# Patient Record
Sex: FEMALE | Race: WHITE | NOT HISPANIC OR LATINO | Employment: OTHER | URBAN - NONMETROPOLITAN AREA
[De-identification: names, ages, dates, MRNs, and addresses within clinical notes are randomized per-mention and may not be internally consistent; named-entity substitution may affect disease eponyms.]

---

## 2017-06-01 ENCOUNTER — TRANSFERRED RECORDS (OUTPATIENT)
Dept: HEALTH INFORMATION MANAGEMENT | Facility: OTHER | Age: 62
End: 2017-06-01

## 2018-06-21 ENCOUNTER — TRANSFERRED RECORDS (OUTPATIENT)
Dept: HEALTH INFORMATION MANAGEMENT | Facility: OTHER | Age: 63
End: 2018-06-21

## 2019-06-25 ENCOUNTER — TRANSFERRED RECORDS (OUTPATIENT)
Dept: HEALTH INFORMATION MANAGEMENT | Facility: OTHER | Age: 64
End: 2019-06-25

## 2020-07-24 ENCOUNTER — TRANSFERRED RECORDS (OUTPATIENT)
Dept: HEALTH INFORMATION MANAGEMENT | Facility: OTHER | Age: 65
End: 2020-07-24

## 2021-08-03 ENCOUNTER — TRANSFERRED RECORDS (OUTPATIENT)
Dept: HEALTH INFORMATION MANAGEMENT | Facility: OTHER | Age: 66
End: 2021-08-03

## 2021-08-03 ENCOUNTER — TRANSFERRED RECORDS (OUTPATIENT)
Dept: HEALTH INFORMATION MANAGEMENT | Facility: CLINIC | Age: 66
End: 2021-08-03
Payer: COMMERCIAL

## 2021-10-21 ENCOUNTER — ALLIED HEALTH/NURSE VISIT (OUTPATIENT)
Dept: FAMILY MEDICINE | Facility: OTHER | Age: 66
End: 2021-10-21
Attending: FAMILY MEDICINE
Payer: COMMERCIAL

## 2021-10-21 DIAGNOSIS — Z20.822 EXPOSURE TO 2019 NOVEL CORONAVIRUS: ICD-10-CM

## 2021-10-21 DIAGNOSIS — Z20.822 COVID-19 RULED OUT: Primary | ICD-10-CM

## 2021-10-21 DIAGNOSIS — R05.9 COUGH: ICD-10-CM

## 2021-10-21 PROCEDURE — C9803 HOPD COVID-19 SPEC COLLECT: HCPCS

## 2021-10-21 PROCEDURE — U0003 INFECTIOUS AGENT DETECTION BY NUCLEIC ACID (DNA OR RNA); SEVERE ACUTE RESPIRATORY SYNDROME CORONAVIRUS 2 (SARS-COV-2) (CORONAVIRUS DISEASE [COVID-19]), AMPLIFIED PROBE TECHNIQUE, MAKING USE OF HIGH THROUGHPUT TECHNOLOGIES AS DESCRIBED BY CMS-2020-01-R: HCPCS | Mod: ZL

## 2021-10-22 LAB — SARS-COV-2 RNA RESP QL NAA+PROBE: POSITIVE

## 2021-10-30 ENCOUNTER — HEALTH MAINTENANCE LETTER (OUTPATIENT)
Age: 66
End: 2021-10-30

## 2022-03-30 ENCOUNTER — OFFICE VISIT (OUTPATIENT)
Dept: FAMILY MEDICINE | Facility: OTHER | Age: 67
End: 2022-03-30
Attending: FAMILY MEDICINE
Payer: COMMERCIAL

## 2022-03-30 ENCOUNTER — HOSPITAL ENCOUNTER (OUTPATIENT)
Dept: GENERAL RADIOLOGY | Facility: OTHER | Age: 67
Discharge: HOME | End: 2022-03-30
Attending: FAMILY MEDICINE
Payer: COMMERCIAL

## 2022-03-30 VITALS
HEART RATE: 61 BPM | OXYGEN SATURATION: 97 % | WEIGHT: 232.8 LBS | RESPIRATION RATE: 16 BRPM | TEMPERATURE: 97.7 F | DIASTOLIC BLOOD PRESSURE: 82 MMHG | SYSTOLIC BLOOD PRESSURE: 148 MMHG

## 2022-03-30 DIAGNOSIS — R10.9 RIGHT FLANK PAIN: Primary | ICD-10-CM

## 2022-03-30 DIAGNOSIS — T75.3XXD MOTION SICKNESS, SUBSEQUENT ENCOUNTER: ICD-10-CM

## 2022-03-30 DIAGNOSIS — I10 BENIGN ESSENTIAL HYPERTENSION: ICD-10-CM

## 2022-03-30 DIAGNOSIS — R10.9 RIGHT FLANK PAIN: ICD-10-CM

## 2022-03-30 DIAGNOSIS — Z00.00 HEALTHCARE MAINTENANCE: ICD-10-CM

## 2022-03-30 LAB
ALBUMIN SERPL-MCNC: 4.3 G/DL (ref 3.5–5.7)
ALBUMIN UR-MCNC: NEGATIVE MG/DL
ALP SERPL-CCNC: 51 U/L (ref 34–104)
ALT SERPL W P-5'-P-CCNC: 18 U/L (ref 7–52)
APPEARANCE UR: CLEAR
AST SERPL W P-5'-P-CCNC: 18 U/L (ref 13–39)
BACTERIA #/AREA URNS HPF: ABNORMAL /HPF
BILIRUB DIRECT SERPL-MCNC: 0.1 MG/DL (ref 0–0.2)
BILIRUB SERPL-MCNC: 0.5 MG/DL (ref 0.3–1)
BILIRUB UR QL STRIP: NEGATIVE
COLOR UR AUTO: ABNORMAL
GLUCOSE UR STRIP-MCNC: NEGATIVE MG/DL
HGB UR QL STRIP: NEGATIVE
HYALINE CASTS: 1 /LPF
KETONES UR STRIP-MCNC: NEGATIVE MG/DL
LEUKOCYTE ESTERASE UR QL STRIP: ABNORMAL
MUCOUS THREADS #/AREA URNS LPF: PRESENT /LPF
NITRATE UR QL: NEGATIVE
PH UR STRIP: 5 [PH] (ref 5–9)
PROT SERPL-MCNC: 7.3 G/DL (ref 6.4–8.9)
RBC URINE: <1 /HPF
SP GR UR STRIP: 1.02 (ref 1–1.03)
SQUAMOUS EPITHELIAL: 1 /HPF
UROBILINOGEN UR STRIP-MCNC: NORMAL MG/DL
WBC URINE: 2 /HPF

## 2022-03-30 PROCEDURE — 71101 X-RAY EXAM UNILAT RIBS/CHEST: CPT | Mod: RT

## 2022-03-30 PROCEDURE — 80076 HEPATIC FUNCTION PANEL: CPT | Mod: ZL | Performed by: FAMILY MEDICINE

## 2022-03-30 PROCEDURE — 99204 OFFICE O/P NEW MOD 45 MIN: CPT | Performed by: FAMILY MEDICINE

## 2022-03-30 PROCEDURE — 81001 URINALYSIS AUTO W/SCOPE: CPT | Mod: ZL | Performed by: FAMILY MEDICINE

## 2022-03-30 PROCEDURE — G0463 HOSPITAL OUTPT CLINIC VISIT: HCPCS

## 2022-03-30 PROCEDURE — 36415 COLL VENOUS BLD VENIPUNCTURE: CPT | Mod: ZL | Performed by: FAMILY MEDICINE

## 2022-03-30 RX ORDER — ASPIRIN 81 MG/1
81 TABLET, CHEWABLE ORAL DAILY
COMMUNITY

## 2022-03-30 RX ORDER — MULTIVIT-MIN/IRON/FOLIC ACID/K 18-600-40
2 CAPSULE ORAL DAILY
COMMUNITY

## 2022-03-30 RX ORDER — BIOTIN 1 MG
5000 TABLET ORAL DAILY
COMMUNITY

## 2022-03-30 RX ORDER — MULTIVIT-MIN/IRON/FOLIC ACID/K 18-600-40
1 CAPSULE ORAL DAILY
COMMUNITY

## 2022-03-30 RX ORDER — HYDROCHLOROTHIAZIDE 25 MG/1
25 TABLET ORAL DAILY
Qty: 90 TABLET | Refills: 3 | Status: SHIPPED | OUTPATIENT
Start: 2022-03-30 | End: 2023-06-15

## 2022-03-30 RX ORDER — HYDROCHLOROTHIAZIDE 25 MG/1
TABLET ORAL
COMMUNITY
Start: 2022-03-23 | End: 2022-03-30

## 2022-03-30 RX ORDER — ONDANSETRON 4 MG/1
4 TABLET, FILM COATED ORAL EVERY 8 HOURS PRN
Qty: 20 TABLET | Refills: 1 | Status: SHIPPED | OUTPATIENT
Start: 2022-03-30 | End: 2023-07-10

## 2022-03-30 RX ORDER — METOPROLOL TARTRATE 50 MG
TABLET ORAL
COMMUNITY
Start: 2022-03-20 | End: 2022-03-30

## 2022-03-30 RX ORDER — METOPROLOL TARTRATE 50 MG
50 TABLET ORAL 2 TIMES DAILY
Qty: 180 TABLET | Refills: 3 | Status: SHIPPED | OUTPATIENT
Start: 2022-03-30 | End: 2023-06-15

## 2022-03-30 RX ORDER — CHLORAL HYDRATE 500 MG
2 CAPSULE ORAL DAILY
COMMUNITY

## 2022-03-30 RX ORDER — ONDANSETRON 4 MG/1
TABLET, FILM COATED ORAL
COMMUNITY
Start: 2021-09-07 | End: 2022-03-30

## 2022-03-30 ASSESSMENT — PAIN SCALES - GENERAL: PAINLEVEL: NO PAIN (0)

## 2022-03-30 NOTE — PATIENT INSTRUCTIONS
R sided pain:  Rib XR, liver function tests, urinalysis today  If these are normal we will get an ultrasound of the liver.   Could consider physical therapy as well    Mammogram due in 8/2022    Check BPs at home, notify Dr. Rogers if frequently >140/90.    Follow up again this summer

## 2022-03-30 NOTE — NURSING NOTE
Chief Complaint   Patient presents with     Establish Care     right sided abdominal pain, since Dec/Adrian, at times sharp       Initial BP (!) 148/82 (BP Location: Right arm, Patient Position: Sitting, Cuff Size: Adult Regular)   Pulse 61   Temp 97.7  F (36.5  C) (Tympanic)   Resp 16   Wt 105.6 kg (232 lb 12.8 oz)   LMP  (LMP Unknown)   SpO2 97%  There is no height or weight on file to calculate BMI.  Medication Reconciliation: complete    Maryjane Granger LPN    Advance Care Directive reviewed

## 2022-03-30 NOTE — PROGRESS NOTES
Assessment & Plan     Right flank pain  MSK vs liver etiology, less likely gallbladder. Also consider colon spasm at hepatic flexure, or ureteral spasm. Denies hx of liver or kidney issues.   LFTs, UA, and XR of R ribs today. If normal, will order RUQUS.  Referral to chiropractic as well. Consider PT referral.   - Hepatic Function Panel  - UA reflex to Microscopic  - XR Ribs & Chest Rt 3vw  - Chiropractic Referral    Benign essential hypertension  Medications refilled today. Borderline elevated BP today - continue home monitoring and may need to increase meds and/or recommend lifestyle modifications in the future.   - metoprolol tartrate (LOPRESSOR) 50 MG tablet  Dispense: 180 tablet; Refill: 3  - hydrochlorothiazide (HYDRODIURIL) 25 MG tablet  Dispense: 90 tablet; Refill: 3    Motion sickness, subsequent encounter  Zofran used prn for motion sickness while on boats.   - ondansetron (ZOFRAN) 4 MG tablet  Dispense: 20 tablet; Refill: 1    Healthcare maintenance   Mammogram up-to-date, next due in 8/2022. Consider repeat DEXA scan. Colonoscopy due in 2025. Consider repeat lipids in several months, does not want to be on statin.     Patient was discussed with Dr. Rogers.    Jo Ann Pinto MD  [facility], PGY-2  3/30/2022 5:01 PM     Above reviewed and discussed with 2nd year FM Resident, Dr. Jo Ann Pinto. Patient interviewed and examined by myself. Agree with above plan.     Melany Rogers MD  [facility]        Chase Drew is a 66 year old who presents for the following health issues:    R sided pain  - Intermittent episodes since December, almost daily, sometimes up to 6x/day.  - Sharp, stabbing pain on R side of abdomen, no radiation.   - Can occur when sleeping/lying down, watching a movie. Often worse when up walking, but not triggered by movement specifically. Not worse with eating.   - Feels to be more surface level, but no skin changes.   - No similar sx  previously. No injuries. Did trip and fall in December and landed on L hand, but didn't hit R side. No known liver or kidney issues.  - Night sweats for years, no fever or chills. No n/v/d. No abdominal bloating.     BP  - Takes hydrochlorothiazide and metoprolol   - Home BPs typically 130-140s/70-80s    Motion sickness  - When riding in a car or boat  - Would like Zofran to use prn, does not need very often    HCM  - DEXA nml in 6/2017  - Mammo neg in 8/2021  - Colonoscopy in 7/2015, due for repeat in 10 years  - Lipids mildly elevated in 9/2021, declines statin  - HIV and hep C testing declined       History of Present Illness       Reason for visit:  Pain on right side  Symptom onset:  More than a month  Symptoms include:  Occasional sharp, stabbing pain  Symptom intensity:  Moderate  Symptom progression:  Staying the same  Had these symptoms before:  Yes  Has tried/received treatment for these symptoms:  No  What makes it worse:  No  What makes it better:  No    She eats 2-3 servings of fruits and vegetables daily.She consumes 0 sweetened beverage(s) daily.She exercises with enough effort to increase her heart rate 10 to 19 minutes per day.  She exercises with enough effort to increase her heart rate 3 or less days per week.   She is taking medications regularly.           Objective    BP (!) 148/82 (BP Location: Right arm, Patient Position: Sitting, Cuff Size: Adult Regular)   Pulse 61   Temp 97.7  F (36.5  C) (Tympanic)   Resp 16   Wt 105.6 kg (232 lb 12.8 oz)   LMP  (LMP Unknown)   SpO2 97%   There is no height or weight on file to calculate BMI.  Physical Exam   GENERAL: healthy, alert and no distress  EYES: Eyes grossly normal to inspection, PERRL and conjunctivae and sclerae normal  HENT: nose and mouth without ulcers or lesions  NECK: supple, no adenopathy  RESP: lungs clear to auscultation - no rales, rhonchi or wheezes  CV: regular rate and rhythm, normal S1 S2, subtle S3, no murmur, click or rub,  no significant peripheral edema  ABDOMEN/TRUNK: BS hypoactive, soft, mild abdominal fullness upper quadrants, no appreciable masses, mild tenderness to palpation epigastric region and R side over lower ribs  BACK: no CVA tenderness, no paralumbar tenderness  SKIN: no suspicious lesions or rashes on exposed skin

## 2022-04-01 DIAGNOSIS — R91.8 PULMONARY NODULES: Primary | ICD-10-CM

## 2022-04-06 ENCOUNTER — HOSPITAL ENCOUNTER (OUTPATIENT)
Dept: CT IMAGING | Facility: OTHER | Age: 67
Discharge: HOME | End: 2022-04-06
Attending: FAMILY MEDICINE | Admitting: FAMILY MEDICINE
Payer: COMMERCIAL

## 2022-04-06 DIAGNOSIS — R91.8 PULMONARY NODULES: ICD-10-CM

## 2022-04-06 PROCEDURE — 250N000011 HC RX IP 250 OP 636: Performed by: FAMILY MEDICINE

## 2022-04-06 PROCEDURE — 71260 CT THORAX DX C+: CPT

## 2022-04-06 RX ORDER — IOPAMIDOL 755 MG/ML
100 INJECTION, SOLUTION INTRAVASCULAR ONCE
Status: COMPLETED | OUTPATIENT
Start: 2022-04-06 | End: 2022-04-06

## 2022-04-06 RX ADMIN — IOPAMIDOL 100 ML: 755 INJECTION, SOLUTION INTRAVENOUS at 12:37

## 2022-04-12 ENCOUNTER — THERAPY VISIT (OUTPATIENT)
Dept: CHIROPRACTIC MEDICINE | Facility: OTHER | Age: 67
End: 2022-04-12
Attending: FAMILY MEDICINE
Payer: COMMERCIAL

## 2022-04-12 VITALS — HEART RATE: 58 BPM | OXYGEN SATURATION: 97 % | RESPIRATION RATE: 18 BRPM | TEMPERATURE: 98 F

## 2022-04-12 DIAGNOSIS — R10.9 RIGHT FLANK PAIN: ICD-10-CM

## 2022-04-12 PROCEDURE — G0463 HOSPITAL OUTPT CLINIC VISIT: HCPCS

## 2022-04-12 PROCEDURE — 99213 OFFICE O/P EST LOW 20 MIN: CPT | Mod: GY | Performed by: CHIROPRACTOR

## 2022-04-12 NOTE — PROGRESS NOTES
Started in December had fallen in house. Unsure if this is the cause. Intermittent right mid/upper back sharp stabbing pain. 0/10 W24 10/10. When it happens she breathes through it and it is gone.   Aga Liza on 4/12/2022 at 9:14 AM      Apr 12, 2022  PATIENT:  Benita Padilla is a 66 year old  female presenting for chiropractic evaluation for: right Torso pain  Referred by: Melany Rogers MD.     Date of Initial Visit for this Episode:  Apr 12, 2022   Visit #1/6-8    SUBJECTIVE / HPI: . Grabbing pain pointing to intercostal mm.focal Right 8th-9th intercostal space along axillary line.   Fell to her left onto left arm outstretched 4 months ago.    Saw Dr. Rogers recently, thought to be MSK. No pain with deep breathing.     Onset: 5 months ago  Location: focal Right 8th-9th intercostal space along axillary line  Worse with:  Spontaneous, may be resting or may be moving, no connection.  Improved by:  Pressing on it    Other Health Care Providers seen for this: Dr. Rogers, chest xray and Chest CT scans. No fractures.  Thoracic DJD seen on CT scan.    Previous injury:  No known injury to area pror to fall in Decemeber.    See flowsheets in chart for details.  No flowsheet data found.    Oswestry (CINDY) Questionnaire    OSWESTRY DISABILITY INDEX 4/12/2022   Count 9   Sum 3   Oswestry Score (%) 6.67   Some recent data might be hidden       Review of SystemsThe patient denies any fevers, chills, nausea, vomiting, diarrhea, constipation,dysuria, hematuria, or urinary hesitancy or incontinence.  No shortness of breath, chest pain, or rashes.    There is no problem list on file for this patient.      ALLERGIES:  No Known Allergies    CURRENT MEDICATIONS:  Current Outpatient Medications   Medication     Ascorbic Acid (VITAMIN C) 500 MG CAPS     aspirin (ASA) 81 MG chewable tablet     biotin 1000 MCG TABS tablet     CALCIUM CARB-MAGNESIUM CARB PO     fish oil-omega-3 fatty acids 1000 MG capsule     hydrochlorothiazide  "(HYDRODIURIL) 25 MG tablet     metoprolol tartrate (LOPRESSOR) 50 MG tablet     omeprazole (PRILOSEC) 20 MG DR capsule     ondansetron (ZOFRAN) 4 MG tablet     Vitamin D, Cholecalciferol, 25 MCG (1000 UT) TABS     No current facility-administered medications for this visit.       PAST MEDICAL HISTORY:  No past medical history on file.    PAST SURGICAL HISTORY:  No past surgical history on file.  DNC  FAMILY HISTORY:  No family history on file.  Mom  CVA, father CVA, osteoarthritis  Siblings Mental, Thyroid disease, Hypertension      SOCIAL HISTORY:    Social History     Social History Narrative     Not on file     Occupation: retired.   Her daughter and her 19 mo old  twin granddaughters  live with her and she helps care for them.      Exercise habits:  Sleeping habits:      OBJECTIVE:    DIAGNOSTIC TESTS:  Reviewed chart and current chest and rib xray imaging taken.   There is subpleural scarring in the right lower lobe   associated with lateral osteophytes.    PHYSICAL EXAM:   Pulse 58   Temp 98  F (36.7  C) (Tympanic)   Resp 18   LMP  (LMP Unknown)   SpO2 97%     GENERAL APPEARANCE: {ROBERTO GENERAL APPEARANCE:50::\"healthy\",\"alert\",\"no distress\",\"affect normal/bright\"    SKIN: no suspicious lesions or rashes. Redness remains R thoracic following palpation.  NEURO: cranial nerves 2-12 intact, normal gait.       MUSCULOSKELETAL:   Posture and Gait: Right torso shift.    Moderate forward head, prominent upper thoracic kyphosis.      Thoracic  Shoulder position: R forward, sloping bilateral    AROM:  Limited extension and lateral bending     30/60 flexion 30/60 extension    30/45 RLF    45/45 LLF   35/45 RR      35/45 LR     +Kemps: + thoracic    Tenderness: T9 on right, T9 intercostal mm along midaxillary line with no rib tenderness.    Left lower costochondral, R upper quadrant Abdomen.    Muscle spasm:  Trapezius, rhomboids, shortened pectorals    +Joint asymmetry and restriction: thoracic      ASSESSMENT: " Benita Padilla has imaging that shows significant osteophytes corresponding to this level.     Right flank pain    PLAN    Evaluation and Management :    Consider dicussion of ordering xrays to further evaluated number and extent of thoracic spine, levels and degree of degeneration with multiple levels of vertebral body osteophytes seen on chest xray. Left message with  Dr. Rogers for co-sign.    Also think this patient would benefit from co-treatment with Physical Therapy referral for soft tissue therapy.      Modalities: none  Therapeutic procedures:  Thoracic ROM and chest stretching. 5 min.    CMT:  None performed today.    Response to Treatment:  Stretches felt helpful.  Prognosis: Guarded    Apr 12, 2022 Chiropractic Plan of Care:   Impression, treatment options, risks and benefits discussed and patient agreeable to plan of care.     Chiropractic Care including Spinal Adjustments and may include physiotherapy modalities (Manual Therapy and Manual or Instrument Soft tissue massage techniques, Ultrasound, Electronic Muscle Stimulation), Neuromuscular Re-education, Self care and Active care instruction(in-office exercise focused toward patient establishing a progressive home based exercise program).  Plan of care includes shared decision making with patient to meet their individual subjective and established criteria of rehabilitative objective goals to reduce symptoms affecting function.       Frequency: 2xweek   Length:4 weeks  Progress Evaluation (approx date):  at 2 - 4 weeks         Apr 12, 2022 Goals:   short term     Patient will demonstrate an improved ability to complete Activities of Daily Living  as shown by a reported 25-30% reduced score on neck and/or back index.    Patient will demonstrate improved ROM.     Patient will report reduced symptoms by 2 points on 0-10 pt. Scale.      INSTRUCTIONS   Patient Instructions   Xray possibly, need MD order.  T/S ROM stretching as shown.  Schedule f/u  PT for  soft tissue therapy,  not able to provide with insurance coverage in chiropractic office. Need MD order.     Will discuss case with dr. Rogers and notify patient of next steps.       stretch as instructed at visit    Return for Active Care.

## 2022-04-12 NOTE — PATIENT INSTRUCTIONS
Xray possibly, need MD order.  T/S ROM stretching as shown.  Schedule f/u  PT for soft tissue therapy,  not able to provide with insurance coverage in chiropractic office. Need MD order.     Will discuss case with dr. Rogers and notify patient of next steps.

## 2022-04-27 DIAGNOSIS — M54.6 MIDLINE THORACIC BACK PAIN, UNSPECIFIED CHRONICITY: ICD-10-CM

## 2022-04-27 DIAGNOSIS — R10.9 RIGHT FLANK PAIN: Primary | ICD-10-CM

## 2022-05-02 ENCOUNTER — HOSPITAL ENCOUNTER (OUTPATIENT)
Dept: GENERAL RADIOLOGY | Facility: OTHER | Age: 67
Discharge: HOME | End: 2022-05-02
Attending: FAMILY MEDICINE | Admitting: FAMILY MEDICINE
Payer: COMMERCIAL

## 2022-05-02 DIAGNOSIS — M54.6 MIDLINE THORACIC BACK PAIN, UNSPECIFIED CHRONICITY: ICD-10-CM

## 2022-05-02 PROCEDURE — 72072 X-RAY EXAM THORAC SPINE 3VWS: CPT

## 2022-07-12 ENCOUNTER — HOSPITAL ENCOUNTER (OUTPATIENT)
Dept: PHYSICAL THERAPY | Facility: OTHER | Age: 67
Setting detail: THERAPIES SERIES
Discharge: HOME | End: 2022-07-12
Attending: FAMILY MEDICINE
Payer: COMMERCIAL

## 2022-07-12 DIAGNOSIS — M54.6 MIDLINE THORACIC BACK PAIN, UNSPECIFIED CHRONICITY: ICD-10-CM

## 2022-07-12 PROCEDURE — 97161 PT EVAL LOW COMPLEX 20 MIN: CPT | Mod: GP

## 2022-07-12 PROCEDURE — 97112 NEUROMUSCULAR REEDUCATION: CPT | Mod: GP

## 2022-07-12 PROCEDURE — 97140 MANUAL THERAPY 1/> REGIONS: CPT | Mod: GP

## 2022-07-12 NOTE — PROGRESS NOTES
07/12/22 1100   General Information   Type of Visit Initial OP Ortho PT Evaluation   Start of Care Date 07/12/22   Referring Physician Melany Rogers MD   Patient/Family Goals Statement To decrease my right rib pain   Orders Evaluate and Treat   Date of Order 04/27/22   Certification Required? Yes   Medical Diagnosis Midline thoracic back pain, unspecified chronicity (M54.6)   Surgical/Medical history reviewed Yes   Body Part(s)   Body Part(s) Cervical Spine   Presentation and Etiology   Pertinent history of current problem (include personal factors and/or comorbidities that impact the POC) Pt is referred to PT due to midline thoracic back pain, unspecified chronicity (M54.6). She has moved to the area recently. She was previously followed by [facility] [place] and has established care at [facility] with Dr. Rogers. Pain is located in the right flank area.  Reports a fall in December which may be contributory: she fell while rearranging highchairs, landing on her left wrist/forearm. Notes that she has not had pain for about 3 weeks but still thought she would come in for evaluation. Notes that when pain is present it is a sharp pain which takes her breath away. It is not predictable and usually does not last long.  She is using heat if it persists which helps ease the pain.  Sometimes is only momentary.   She is improving. Had a CT scan and xrays which were unremarkable other than degenerative changes. Notes no past history of similar complaints. She has had back pain on and off--less now than when she was younger.  Her daughter and grandchildren (twins who are 22 months old and a granddaughter who is 3  .) live with them so she is lifting the grandkids often. Notes that she has also had more stress in the last year. Aggravating factors: nothing predictable. Easing factors: Heat makes it feels better. Past medical hx/comorbidities: right flank pain, benign essential hypertension, GERD, motion sickness, hx of skin  squamous cell carcinoma, obesity.   Impairments A. Pain   Functional Limitations Other  (Unpredictable pain--takes breath away at times. If persistent, has to stop and use heat.)   Symptom Location right lower ribs laterally   How/Where did it occur From insidious onset  (Did fall to the left side in December, 2021--unsure if related.)   Onset date of current episode/exacerbation 04/27/22   Chronicity New   Pain rating (0-10 point scale) Best (/10);Worst (/10)   Best (/10) 0   Worst (/10) 10   Pain quality F. Stabbing   Frequency of pain/symptoms B. Intermittent   Pain/symptoms are: Other   Pain symptoms comment not predictable   Pain/symptoms exacerbated by   (nothing specific--random)   Pain/symptoms eased by G. Heat   Progression of symptoms since onset: Improved   Current / Previous Interventions   Diagnostic Tests: CT scan;X-ray   X-ray Results Results   X-ray results 3-30-22: No pneumothorax or evidence of an acute right rib fracture. Question 6 mm right lung nodule. Calcified mediastinal/hilar nodes. 5-2-22: Levoscoliosis of the thoracic spine. Diffuse degenerative changes.   CT Results Results   CT results 4-6-22: No evidence of acute rib fracture. 6 mm benign calcified right lower lobe micronodule. No further  follow-up is required.   Prior Level of Function   Functional Level Prior Comment No previous issues with similar pain. Was completing all her normal activities prior to onset.   Current Level of Function   Patient role/employment history F. Retired  (psychiatric nurse)   Living environment [place]   Fall Risk Screen   Fall screen completed by PT   Have you fallen 2 or more times in the past year? No  (one fall in December, 2021)   Have you fallen and had an injury in the past year? No   Is patient a fall risk? No   Fall screen comments Did not get hurt when she fell other than bruising. Was moving highchairs around when she fell. Not concerned about her balance.   Abuse Screen (yes response  "referral indicated)   Feels Unsafe at Home or Work/School no   Feels Threatened by Someone no   Does Anyone Try to Keep You From Having Contact with Others or Doing Things Outside Your Home? no   Physical Signs of Abuse Present no   Thoracic Spine   Thoracic Flexion ROM Mild decrease: forward bend to 10\" fingertips to floor but pt states this is her normal.   Thoracic Extension ROM decreased   Thoracic Right Side Bending ROM symmetrical   Thoracic Left Sidebending ROM  symmetrical   Thoracic Right Rotation mild decrease   Shoulder AROM Screen WFL with screening   Cervical/Thoracic/Shoulder ROM Comments All motion is painfree.   Segmental Mobility-Thoracic No specific facet restrictions noted. Generalized decrease in extension.   Palpation Local listening to the right kidney with extended listening to the 9th, 10th and 11th ribs R, intercostal region laterally. Absent motility of the R kidney noted. Specific fascial restrictions in the  R renal and intrarenal fascia, QL right. Tender with palpation of the R kidney both anterior and posterior. Tender and guarded in the R intercostals ribs 9-11 with irritability of the intercostal nerves over the lateral ribcage. Non tender on the L and in the thoracic spine.   Observation General listening to the right lateral rib cage. Postural loading decreased R/R at shoulders and pelvis, L/R at shoulders.   Posture Significant forward head, thoracic kyphosis, decreased lumbar lordosis   Planned Therapy Interventions   Planned Therapy Interventions joint mobilization;manual therapy;neuromuscular re-education;ROM;strengthening;stretching   Planned Modality Interventions   Planned Modality Interventions Cryotherapy;Electrical stimulation;Hydrotherapy   Planned Modality Interventions Comments if indicated   Clinical Impression   Criteria for Skilled Therapeutic Interventions Met yes, treatment indicated   PT Diagnosis Impaired mobility due to R thoracic and rib pain   Influenced by " the following impairments postural dysfunction, myofascial restriction including organ specific, neural restrictions.   Functional limitations due to impairments Intermittent, non predictable pain which will take her breath away and at times cause her to stop activity.   Clinical Presentation Stable/Uncomplicated   Clinical Decision Making (Complexity) Low complexity   Therapy Frequency   (1-2 times per week)   Predicted Duration of Therapy Intervention (days/wks) 3 months   Risk & Benefits of therapy have been explained Yes   Patient, Family & other staff in agreement with plan of care Yes   Clinical Impression Comments Pt is referred to PT due to thoracic back pain. She presents with intercostal muscle and nerve involvement as well as fascial restrictions in the R renal area. PT is indicated to address the above noted impairments and functional limitations. Pt is motivated to participate in her rehab program.   Education Assessment   Preferred Learning Style Listening   Barriers to Learning Hearing  (R ear)   ORTHO GOALS   PT Ortho Eval Goals 1;2;3   Ortho Goal 1   Goal Identifier HEP   Goal Description Pt to display independence and at least daily performance of HEP 5 of 7 days or more to assist in self management, pain control.   Target Date 10/07/22   Ortho Goal 2   Goal Identifier Pain   Goal Description Pt to report decreased episodes of R rib pain by at least 50% to allow for minimal interference with daily tasks, lifting grandkids.   Target Date 10/07/22   Ortho Goal 3   Goal Identifier myofascial   Goal Description Pt to display improved myofascial mobility in the R thoracic cage to allow for symmetrical postural loading to assist with performing normal daily activities without pain 75% of the time or more.   Target Date 10/07/22   Total Evaluation Time   PT Eval, Low Complexity Minutes (42747) 30   Therapy Certification   Certification date from 07/12/22   Certification date to 10/07/22   Medical  Diagnosis Midline thoracic back pain, unspecified chronicity (M54.6)   Lona Sánchez, PT on 7/12/2022 at 2:41 PM

## 2022-07-12 NOTE — PROGRESS NOTES
[facility]    OUTPATIENT PHYSICAL THERAPY ORTHOPEDIC EVALUATION  PLAN OF TREATMENT FOR OUTPATIENT REHABILITATION  (COMPLETE FOR INITIAL CLAIMS ONLY)  Patient's Last Name, First Name, M.I.  YOB: 1955  Benita Padilla    Provider s Name:  [facility]   Medical Record No.  [id number]   Start of Care Date:  07/12/22   Onset Date:  04/27/22   Type:     _X__PT   ___OT   ___SLP Medical Diagnosis:  Midline thoracic back pain, unspecified chronicity (M54.6)     PT Diagnosis:  Impaired mobility due to R thoracic and rib pain   Visits from SOC:  1      _________________________________________________________________________________  Plan of Treatment/Functional Goals:  joint mobilization, manual therapy, neuromuscular re-education, ROM, strengthening, stretching     Cryotherapy, Electrical stimulation, Hydrotherapy  if indicated  Goals  Goal Identifier: HEP  Goal Description: Pt to display independence and at least daily performance of HEP 5 of 7 days or more to assist in self management, pain control.  Target Date: 10/07/22    Goal Identifier: Pain  Goal Description: Pt to report decreased episodes of R rib pain by at least 50% to allow for minimal interference with daily tasks, lifting grandkids.  Target Date: 10/07/22    Goal Identifier: myofascial  Goal Description: Pt to display improved myofascial mobility in the R thoracic cage to allow for symmetrical postural loading to assist with performing normal daily activities without pain 75% of the time or more.  Target Date: 10/07/22            Therapy Frequency:   (1-2 times per week)  Predicted Duration of Therapy Intervention:  3 months    Lona Sánchez, PT                 I CERTIFY THE NEED FOR THESE SERVICES FURNISHED UNDER        THIS PLAN OF TREATMENT AND WHILE UNDER MY CARE     (Physician co-signature of this  document indicates review and certification of the therapy plan).                     Certification Date From:  07/12/22   Certification Date To:  10/07/22    Referring Provider:  Melany Rogers MD    Initial Assessment        See Epic Evaluation Start of Care Date: 07/12/22

## 2022-07-18 ENCOUNTER — HOSPITAL ENCOUNTER (OUTPATIENT)
Dept: PHYSICAL THERAPY | Facility: OTHER | Age: 67
Setting detail: THERAPIES SERIES
Discharge: HOME | End: 2022-07-18
Attending: FAMILY MEDICINE
Payer: COMMERCIAL

## 2022-07-18 PROCEDURE — 97110 THERAPEUTIC EXERCISES: CPT | Mod: GP

## 2022-07-18 PROCEDURE — 97140 MANUAL THERAPY 1/> REGIONS: CPT | Mod: GP

## 2022-07-18 PROCEDURE — 97112 NEUROMUSCULAR REEDUCATION: CPT | Mod: GP

## 2022-07-25 ENCOUNTER — HOSPITAL ENCOUNTER (OUTPATIENT)
Dept: PHYSICAL THERAPY | Facility: OTHER | Age: 67
Setting detail: THERAPIES SERIES
Discharge: HOME | End: 2022-07-25
Attending: FAMILY MEDICINE
Payer: COMMERCIAL

## 2022-07-25 PROCEDURE — 97140 MANUAL THERAPY 1/> REGIONS: CPT | Mod: GP

## 2022-07-25 PROCEDURE — 97112 NEUROMUSCULAR REEDUCATION: CPT | Mod: GP

## 2022-07-25 PROCEDURE — 97110 THERAPEUTIC EXERCISES: CPT | Mod: GP

## 2022-08-01 ENCOUNTER — HOSPITAL ENCOUNTER (OUTPATIENT)
Dept: PHYSICAL THERAPY | Facility: OTHER | Age: 67
Setting detail: THERAPIES SERIES
Discharge: HOME | End: 2022-08-01
Attending: FAMILY MEDICINE
Payer: COMMERCIAL

## 2022-08-01 PROCEDURE — 97112 NEUROMUSCULAR REEDUCATION: CPT | Mod: GP

## 2022-08-01 PROCEDURE — 97140 MANUAL THERAPY 1/> REGIONS: CPT | Mod: GP

## 2022-08-08 ENCOUNTER — HOSPITAL ENCOUNTER (OUTPATIENT)
Dept: PHYSICAL THERAPY | Facility: OTHER | Age: 67
Setting detail: THERAPIES SERIES
Discharge: HOME | End: 2022-08-08
Attending: FAMILY MEDICINE
Payer: COMMERCIAL

## 2022-08-08 PROCEDURE — 97140 MANUAL THERAPY 1/> REGIONS: CPT | Mod: GP

## 2022-08-08 PROCEDURE — 97112 NEUROMUSCULAR REEDUCATION: CPT | Mod: GP

## 2022-08-22 ENCOUNTER — HOSPITAL ENCOUNTER (OUTPATIENT)
Dept: PHYSICAL THERAPY | Facility: OTHER | Age: 67
Setting detail: THERAPIES SERIES
Discharge: HOME | End: 2022-08-22
Attending: FAMILY MEDICINE
Payer: COMMERCIAL

## 2022-08-22 PROCEDURE — 97140 MANUAL THERAPY 1/> REGIONS: CPT | Mod: GP,PO

## 2022-08-22 PROCEDURE — 97112 NEUROMUSCULAR REEDUCATION: CPT | Mod: GP,PO

## 2022-09-13 ENCOUNTER — TELEPHONE (OUTPATIENT)
Dept: PHYSICAL THERAPY | Facility: OTHER | Age: 67
End: 2022-09-13

## 2022-09-13 NOTE — TELEPHONE ENCOUNTER
Viki contacted rehab reception on 9-12-22, cancelling her PT appointment for 9-14-22, stating she had a bad cough as well as that she was no longer having pain and did not feel appointment was necessary. Will hold chart X 4 weeks in the event she calls back to reschedule.   Lona Sánchez, PT on 9/13/2022 at 7:26 AM

## 2022-10-13 NOTE — PROGRESS NOTES
[facility]    Outpatient Physical Therapy Progress Note    Patient: Benita Padilla  : 1955    Beginning/End Dates of Reporting Period:  22 to 10/13/2022     Referring Provider: Melany Rogers MD    Therapy Diagnosis: Impaired mobility due to R thoracic and rib pain     Client Self Report: Pt was last seen in PT on 22. She contacted the department on 22, canceling her remaining appointment, noting she was no longer having pain and did not need to come in. Current status unknown. Subjective report on 22 as follows:     Notes that she has had no attacks of pain, but if she pokes in that area, she can feel it. Does not have pain with activities and also if she is leaning back into a chair. Since she was last in, she only feels pain if she pushes on the area.    Objective Measurements: Current status unknown. Clinical findings on 22 as follows:  Objective Measure: Postural loading  Details: General listening to the right lateral rib cage. Postural loading decreased R/R at shoulders and pelvis, L/R at shoulders.  Objective Measure: myofascial  Details: Local listening to the  lateral lower ribs.  Tender and guarded in the R intercostals ribs 9-11 with irritability of the intercostal nerves over the lateral ribcage. This is particularly tender over an area of about 3/4 inch along the lateral 9th-10th ribs. Less irritable.  Objective Measure: neural restrictions  Details: Improved but  with restrictions laterally in the 9th and 10th intercostal nerves R.         Goals: Progress toward goals unknown as pt has not been seen since 22. She reported by telephone no further pain on 22  Goal Identifier HEP   Goal Description Pt to display independence and at least daily performance of HEP 5 of 7 days or more to assist in self management, pain control.   Target Date 10/07/22    Date Met      Progress (detail required for progress note):       Goal Identifier Pain   Goal Description Pt to report decreased episodes of R rib pain by at least 50% to allow for minimal interference with daily tasks, lifting grandkids.   Target Date 10/07/22   Date Met      Progress (detail required for progress note):       Goal Identifier myofascial   Goal Description Pt to display improved myofascial mobility in the R thoracic cage to allow for symmetrical postural loading to assist with performing normal daily activities without pain 75% of the time or more.   Target Date 10/07/22   Date Met      Progress (detail required for progress note):         Home program: supine QL stretch, supine modified serena stretch. Box breathing  3 seconds. Abdominal breathing--hand on lower ribs/knees bent., SL thoracic rotation, self progressive trigger point release to tender areas. SL trunk rotation with bolster--L sidelying only      Plan:  Discharge from therapy.    Discharge:    Reason for Discharge: Patient chooses to discontinue therapy with no further pain reported.         Discharge Plan: Patient to continue home program.    Lona Sánchez, PT on 10/13/2022 at 10:57 AM

## 2023-06-12 DIAGNOSIS — I10 BENIGN ESSENTIAL HYPERTENSION: ICD-10-CM

## 2023-06-15 RX ORDER — METOPROLOL TARTRATE 50 MG
TABLET ORAL
Qty: 180 TABLET | Refills: 3 | Status: SHIPPED | OUTPATIENT
Start: 2023-06-15 | End: 2024-06-11

## 2023-06-15 RX ORDER — HYDROCHLOROTHIAZIDE 25 MG/1
25 TABLET ORAL DAILY
Qty: 90 TABLET | Refills: 3 | Status: SHIPPED | OUTPATIENT
Start: 2023-06-15 | End: 2024-06-11

## 2023-06-15 NOTE — TELEPHONE ENCOUNTER
" Disp Refills Start End INDER   metoprolol tartrate (LOPRESSOR) 50 MG tablet 180 tablet 3 3/30/2022  No   Sig - Route: Take 1 tablet (50 mg) by mouth 2 times daily TAKE 1 TABLET BY MOUTH TWICE A DAY - Oral      Disp Refills Start End INDER   hydrochlorothiazide (HYDRODIURIL) 25 MG tablet 90 tablet 3 3/30/2022  No   Sig - Route: Take 1 tablet (25 mg) by mouth daily TAKE 1 TABLET (25 MG TOTAL) BY MOUTH DAILY. - Oral     Requested Prescriptions   Pending Prescriptions Disp Refills     hydrochlorothiazide (HYDRODIURIL) 25 MG tablet [Pharmacy Med Name: HYDROCHLOROTHIAZIDE 25 MG TAB] 90 tablet 3     Sig: TAKE 1 TABLET (25 MG) BY MOUTH DAILY TAKE 1 TABLET (25 MG TOTAL) BY MOUTH DAILY.       Diuretics (Including Combos) Protocol Failed - 6/12/2023 12:46 AM        Failed - Blood pressure under 140/90 in past 12 months     BP Readings from Last 3 Encounters:   03/30/22 (!) 148/82                 Failed - Recent (12 mo) or future (30 days) visit within the authorizing provider's specialty     Patient has had an office visit with the authorizing provider or a provider within the authorizing providers department within the previous 12 mos or has a future within next 30 days. See \"Patient Info\" tab in inbasket, or \"Choose Columns\" in Meds & Orders section of the refill encounter.              Failed - Normal serum creatinine on file in past 12 months     No lab results found.           Failed - Normal serum potassium on file in past 12 months     No lab results found.                 Failed - Normal serum sodium on file in past 12 months     No lab results found.           Passed - Medication is active on med list        Passed - Patient is age 18 or older        Passed - No active pregancy on record        Passed - No positive pregnancy test in past 12 months           metoprolol tartrate (LOPRESSOR) 50 MG tablet [Pharmacy Med Name: METOPROLOL TARTRATE 50 MG TAB] 180 tablet 3     Sig: TAKE 1 TABLET (50 MG) BY MOUTH 2 TIMES DAILY    " "   Beta-Blockers Protocol Failed - 6/12/2023 12:46 AM        Failed - Blood pressure under 140/90 in past 12 months     BP Readings from Last 3 Encounters:   03/30/22 (!) 148/82                 Failed - Recent (12 mo) or future (30 days) visit within the authorizing provider's specialty     Patient has had an office visit with the authorizing provider or a provider within the authorizing providers department within the previous 12 mos or has a future within next 30 days. See \"Patient Info\" tab in inbasket, or \"Choose Columns\" in Meds & Orders section of the refill encounter.              Passed - Patient is age 6 or older        Passed - Medication is active on med list               LOV: 3/30/2022  Future Office visit:    Next 5 appointments (look out 90 days)    Jul 10, 2023  9:00 AM  MyChart Physical Adult with Melany Rogers MD  [facility] ([facility] ) [address]  [place] [address]  [phone]        Routing refill request to provider for review/approval.      Charla Galaviz RN  ....................  6/15/2023   12:45 PM      "

## 2023-06-21 PROBLEM — E66.01 MORBID OBESITY (H): Status: ACTIVE | Noted: 2021-09-07

## 2023-06-21 PROBLEM — Z85.828 HISTORY OF SQUAMOUS CELL CARCINOMA OF SKIN: Status: ACTIVE | Noted: 2021-09-07

## 2023-06-21 PROBLEM — C44.92 SQUAMOUS CELL CARCINOMA OF SKIN: Status: ACTIVE | Noted: 2023-06-21

## 2023-06-21 PROBLEM — K21.9 GASTROESOPHAGEAL REFLUX DISEASE: Status: ACTIVE | Noted: 2018-06-26

## 2023-06-21 PROBLEM — Z87.898 HISTORY OF DISEASE: Status: ACTIVE | Noted: 2018-06-26

## 2023-06-21 PROBLEM — L57.0 ACTINIC KERATOSIS: Status: ACTIVE | Noted: 2023-06-21

## 2023-06-21 RX ORDER — ASCORBIC ACID 1000 MG
200 TABLET ORAL DAILY
COMMUNITY

## 2023-07-06 ASSESSMENT — ENCOUNTER SYMPTOMS
SORE THROAT: 0
PALPITATIONS: 0
BREAST MASS: 0
FREQUENCY: 0
FEVER: 0
CHILLS: 0
MYALGIAS: 0
HEMATOCHEZIA: 0
NERVOUS/ANXIOUS: 0
NAUSEA: 0
COUGH: 0
CONSTIPATION: 0
JOINT SWELLING: 0
DIARRHEA: 0
WEAKNESS: 0
DIZZINESS: 0
HEADACHES: 0
HEMATURIA: 0
HEARTBURN: 0
ARTHRALGIAS: 0
ABDOMINAL PAIN: 1
EYE PAIN: 0
DYSURIA: 0
PARESTHESIAS: 0
SHORTNESS OF BREATH: 0

## 2023-07-06 ASSESSMENT — ACTIVITIES OF DAILY LIVING (ADL): CURRENT_FUNCTION: NO ASSISTANCE NEEDED

## 2023-07-07 ENCOUNTER — HOSPITAL ENCOUNTER (OUTPATIENT)
Dept: MAMMOGRAPHY | Facility: OTHER | Age: 68
Discharge: HOME | End: 2023-07-07
Admitting: FAMILY MEDICINE
Payer: COMMERCIAL

## 2023-07-07 DIAGNOSIS — Z12.31 VISIT FOR SCREENING MAMMOGRAM: ICD-10-CM

## 2023-07-07 PROCEDURE — 77067 SCR MAMMO BI INCL CAD: CPT

## 2023-07-10 ENCOUNTER — OFFICE VISIT (OUTPATIENT)
Dept: FAMILY MEDICINE | Facility: OTHER | Age: 68
End: 2023-07-10
Attending: FAMILY MEDICINE
Payer: COMMERCIAL

## 2023-07-10 VITALS
BODY MASS INDEX: 34.75 KG/M2 | HEIGHT: 65 IN | OXYGEN SATURATION: 95 % | HEART RATE: 55 BPM | SYSTOLIC BLOOD PRESSURE: 130 MMHG | DIASTOLIC BLOOD PRESSURE: 82 MMHG | RESPIRATION RATE: 16 BRPM | TEMPERATURE: 97.6 F | WEIGHT: 208.6 LBS

## 2023-07-10 DIAGNOSIS — E28.39 ESTROGEN DEFICIENCY: Primary | ICD-10-CM

## 2023-07-10 DIAGNOSIS — R73.01 IFG (IMPAIRED FASTING GLUCOSE): ICD-10-CM

## 2023-07-10 DIAGNOSIS — E78.2 MIXED HYPERLIPIDEMIA: ICD-10-CM

## 2023-07-10 DIAGNOSIS — R10.9 RIGHT FLANK PAIN: ICD-10-CM

## 2023-07-10 DIAGNOSIS — Z85.828 HISTORY OF SQUAMOUS CELL CARCINOMA OF SKIN: ICD-10-CM

## 2023-07-10 DIAGNOSIS — I10 PRIMARY HYPERTENSION: ICD-10-CM

## 2023-07-10 DIAGNOSIS — T75.3XXD MOTION SICKNESS, SUBSEQUENT ENCOUNTER: ICD-10-CM

## 2023-07-10 DIAGNOSIS — M54.6 MIDLINE THORACIC BACK PAIN, UNSPECIFIED CHRONICITY: ICD-10-CM

## 2023-07-10 LAB
ANION GAP SERPL CALCULATED.3IONS-SCNC: 9 MMOL/L (ref 7–15)
BUN SERPL-MCNC: 16 MG/DL (ref 8–23)
CALCIUM SERPL-MCNC: 10.1 MG/DL (ref 8.8–10.2)
CHLORIDE SERPL-SCNC: 103 MMOL/L (ref 98–107)
CHOLEST SERPL-MCNC: 256 MG/DL
CREAT SERPL-MCNC: 0.78 MG/DL (ref 0.51–0.95)
DEPRECATED HCO3 PLAS-SCNC: 30 MMOL/L (ref 22–29)
GFR SERPL CREATININE-BSD FRML MDRD: 83 ML/MIN/1.73M2
GLUCOSE SERPL-MCNC: 108 MG/DL (ref 70–99)
HBA1C MFR BLD: 5.5 % (ref 4–6.2)
HDLC SERPL-MCNC: 73 MG/DL
LDLC SERPL CALC-MCNC: 168 MG/DL
NONHDLC SERPL-MCNC: 183 MG/DL
POTASSIUM SERPL-SCNC: 4 MMOL/L (ref 3.4–5.3)
SODIUM SERPL-SCNC: 142 MMOL/L (ref 136–145)
TRIGL SERPL-MCNC: 77 MG/DL

## 2023-07-10 PROCEDURE — 90677 PCV20 VACCINE IM: CPT

## 2023-07-10 PROCEDURE — 80048 BASIC METABOLIC PNL TOTAL CA: CPT | Mod: ZL | Performed by: FAMILY MEDICINE

## 2023-07-10 PROCEDURE — G0439 PPPS, SUBSEQ VISIT: HCPCS | Performed by: FAMILY MEDICINE

## 2023-07-10 PROCEDURE — 80061 LIPID PANEL: CPT | Mod: ZL | Performed by: FAMILY MEDICINE

## 2023-07-10 PROCEDURE — 36415 COLL VENOUS BLD VENIPUNCTURE: CPT | Mod: ZL | Performed by: FAMILY MEDICINE

## 2023-07-10 PROCEDURE — 83036 HEMOGLOBIN GLYCOSYLATED A1C: CPT | Mod: ZL | Performed by: FAMILY MEDICINE

## 2023-07-10 RX ORDER — ONDANSETRON 4 MG/1
4 TABLET, FILM COATED ORAL EVERY 8 HOURS PRN
Qty: 20 TABLET | Refills: 1 | Status: SHIPPED | OUTPATIENT
Start: 2023-07-10 | End: 2023-07-30

## 2023-07-10 ASSESSMENT — ACTIVITIES OF DAILY LIVING (ADL): CURRENT_FUNCTION: NO ASSISTANCE NEEDED

## 2023-07-10 ASSESSMENT — PAIN SCALES - GENERAL: PAINLEVEL: NO PAIN (0)

## 2023-07-10 NOTE — NURSING NOTE
"Chief Complaint   Patient presents with     Wellness Visit     Yearly wellness physical, medication management       Initial /82 (BP Location: Right arm, Patient Position: Sitting, Cuff Size: Adult Regular)   Pulse 55   Temp 97.6  F (36.4  C) (Tympanic)   Resp 16   Ht 1.651 m (5' 5\")   Wt 94.6 kg (208 lb 9.6 oz)   LMP  (LMP Unknown)   SpO2 95%   BMI 34.71 kg/m   Estimated body mass index is 34.71 kg/m  as calculated from the following:    Height as of this encounter: 1.651 m (5' 5\").    Weight as of this encounter: 94.6 kg (208 lb 9.6 oz).  Medication Reconciliation: complete    Maryjane Granger LPN    Advance Care Directive reviewed    "

## 2023-07-10 NOTE — PROGRESS NOTES
"SUBJECTIVE:   Viki is a 67 year old who presents for Preventive Visit.      7/10/2023     9:03 AM   Additional Questions   Roomed by Maryjane   Accompanied by self     Are you in the first 12 months of your Medicare coverage?  No    Healthy Habits:     In general, how would you rate your overall health?  Good    Frequency of exercise:  4-5 days/week    Duration of exercise:  45-60 minutes    Do you usually eat at least 4 servings of fruit and vegetables a day, include whole grains    & fiber and avoid regularly eating high fat or \"junk\" foods?  Yes    Taking medications regularly:  Yes    Medication side effects:  None    Ability to successfully perform activities of daily living:  No assistance needed    Home Safety:  No safety concerns identified    Hearing Impairment:  No hearing concerns    In the past 6 months, have you been bothered by leaking of urine?  No    In general, how would you rate your overall mental or emotional health?  Good    Additional concerns today:  No        Have you ever done Advance Care Planning? (For example, a Health Directive, POLST, or a discussion with a medical provider or your loved ones about your wishes): No, advance care planning information given to patient to review.  Patient plans to discuss their wishes with loved ones or provider.         Fall risk  Fallen 2 or more times in the past year?: No  Any fall with injury in the past year?: No    Cognitive Screening   1) Repeat 3 items (Leader, Season, Table)    2) Clock draw: NORMAL  3) 3 item recall: Recalls 3 objects  Results: 3 items recalled: COGNITIVE IMPAIRMENT LESS LIKELY    Mini-CogTM Copyright LORENA Fox. Licensed by the author for use in [employer]; reprinted with permission ([email or web address]). All rights reserved.      Do you have sleep apnea, excessive snoring or daytime drowsiness?: no    Reviewed and updated as needed this visit by clinical staff   Tobacco  Allergies  Meds              HCM  - DEXA nml in " 6/2017  - Mammo neg in 7/2023  - Colonoscopy in 7/2015, due for repeat in 10 years  - Lipids mildly elevated in 9/2021, declines statin  - HIV and hep C testing declined       Reviewed and updated as needed this visit by Provider                 Social History     Tobacco Use     Smoking status: Never     Smokeless tobacco: Never   Substance Use Topics     Alcohol use: Yes     Comment: once a week maybe             7/6/2023     9:28 AM   Alcohol Use   Prescreen: >3 drinks/day or >7 drinks/week? No     Do you have a current opioid prescription? No  Do you use any other controlled substances or medications that are not prescribed by a provider? None      Current providers sharing in care for this patient include:   Patient Care Team:  No Ref-Primary, Physician as PCP - Melany Eduardo MD as Assigned PCP    The following health maintenance items are reviewed in Epic and correct as of today:  Health Maintenance   Topic Date Due     DEXA  Never done     HEPATITIS C SCREENING  Never done     LIPID  Never done     ZOSTER IMMUNIZATION (1 of 2) Never done     DTAP/TDAP/TD IMMUNIZATION (2 - Td or Tdap) 06/07/2020     COVID-19 Vaccine (3 - Pfizer series) 04/15/2021     MEDICARE ANNUAL WELLNESS VISIT  07/26/2021     INFLUENZA VACCINE (1) 09/01/2023     FALL RISK ASSESSMENT  07/10/2024     COLORECTAL CANCER SCREENING  07/05/2025     MAMMO SCREENING  07/07/2025     ADVANCE CARE PLANNING  07/10/2028     PHQ-2 (once per calendar year)  Completed     Pneumococcal Vaccine: 65+ Years  Completed     IPV IMMUNIZATION  Aged Out     MENINGITIS IMMUNIZATION  Aged Out       Any new diagnosis of family breast, ovarian, or bowel cancer? No    FHS-7:       7/7/2023     8:30 AM   Breast CA Risk Assessment (FHS-7)   Did any of your first-degree relatives have breast or ovarian cancer? No   Did any of your relatives have bilateral breast cancer? No   Did any man in your family have breast cancer? No   Did any woman in your family have  "breast and ovarian cancer? Yes   Did any woman in your family have breast cancer before age 50 y? No   Do you have 2 or more relatives with breast and/or ovarian cancer? No   Do you have 2 or more relatives with breast and/or bowel cancer? No         Pertinent mammograms are reviewed under the imaging tab.    Review of Systems  Constitutional, HEENT, cardiovascular, pulmonary, gi and gu systems are negative, except as otherwise noted.    OBJECTIVE:   /82 (BP Location: Right arm, Patient Position: Sitting, Cuff Size: Adult Regular)   Pulse 55   Temp 97.6  F (36.4  C) (Tympanic)   Resp 16   Ht 1.651 m (5' 5\")   Wt 94.6 kg (208 lb 9.6 oz)   LMP  (LMP Unknown)   SpO2 95%   BMI 34.71 kg/m   Estimated body mass index is 34.71 kg/m  as calculated from the following:    Height as of this encounter: 1.651 m (5' 5\").    Weight as of this encounter: 94.6 kg (208 lb 9.6 oz).  Physical Exam  Constitutional:       Appearance: She is well-developed.   Eyes:      Conjunctiva/sclera: Conjunctivae normal.   Neck:      Thyroid: No thyromegaly.   Cardiovascular:      Rate and Rhythm: Normal rate and regular rhythm.      Heart sounds: Normal heart sounds. No murmur heard.  Pulmonary:      Effort: Pulmonary effort is normal. No respiratory distress.      Breath sounds: Normal breath sounds.   Abdominal:      Palpations: Abdomen is soft.   Lymphadenopathy:      Cervical: No cervical adenopathy.   Skin:     Findings: No rash.   Neurological:      Mental Status: She is alert and oriented to person, place, and time.           Diagnostic Test Results:  Labs reviewed in Epic    ASSESSMENT / PLAN:       ICD-10-CM    1. Estrogen deficiency  E28.39 DX Hip/Pelvis/Spine      2. Motion sickness, subsequent encounter  T75.3XXD ondansetron (ZOFRAN) 4 MG tablet      3. Mixed hyperlipidemia  E78.2 Lipid Panel     Lipid Panel      4. IFG (impaired fasting glucose)  R73.01 Hemoglobin A1c     Hemoglobin A1c      5. Primary hypertension  I10 " "Basic Metabolic Panel     Basic Metabolic Panel      6. Midline thoracic back pain, unspecified chronicity  M54.6 Physical Therapy Referral      7. Right flank pain  R10.9 Physical Therapy Referral      8. History of squamous cell carcinoma of skin  Z85.828 Adult Dermatology Referral        Had improvement of R flank pain with previous physical therapy, symptoms have now returned.   Recommend shingrix and TDAP through pharmacy.   Referred to dermatology per patient request.     COUNSELING:  Reviewed preventive health counseling, as reflected in patient instructions      BMI:   Estimated body mass index is 34.71 kg/m  as calculated from the following:    Height as of this encounter: 1.651 m (5' 5\").    Weight as of this encounter: 94.6 kg (208 lb 9.6 oz).   Weight management plan: Discussed healthy diet and exercise guidelines      She reports that she has never smoked. She has never used smokeless tobacco.      Appropriate preventive services were discussed with this patient, including applicable screening as appropriate for cardiovascular disease, diabetes, osteopenia/osteoporosis, and glaucoma.  As appropriate for age/gender, discussed screening for colorectal cancer, prostate cancer, breast cancer, and cervical cancer. Checklist reviewing preventive services available has been given to the patient.    Reviewed patients plan of care and provided an AVS. The Basic Care Plan (routine screening as documented in Health Maintenance) for Benita meets the Care Plan requirement. This Care Plan has been established and reviewed with the Patient.      Melany Rogers MD  [facility] [place]    Identified Health Risks:    I have reviewed Opioid Use Disorder and Substance Use Disorder risk factors and made any needed referrals.   Review current opioid prescriptions    For a patient with a current opioid prescription:    Reviewed potential Opioid Use Disorder (OUD) risk factors: Yes n/a    Evaluate their pain " severity and current treatment plan:     Provide information on non-opioid treatment options:    Refer to a specialist, as appropriate:    Get more information on pain management in the [facility] Pain Management Best Practices Inter-agency Task Force Report    Screen for potential Substance Use Disorders (SUDs)    Reviewed the patient s potential risk factors for SUDs: Yes n/a    Refer to treatment or specialist, as appropriate:     A screening tool isn t required but you may use one:  Find more information in the [place] on Drug Abuse Screening and Assessment Tools Chart

## 2023-07-28 DIAGNOSIS — T75.3XXD MOTION SICKNESS, SUBSEQUENT ENCOUNTER: ICD-10-CM

## 2023-07-30 RX ORDER — ONDANSETRON 4 MG/1
TABLET, FILM COATED ORAL
Qty: 20 TABLET | Refills: 1 | Status: SHIPPED | OUTPATIENT
Start: 2023-07-30 | End: 2024-06-12

## 2023-08-25 ENCOUNTER — TRANSFERRED RECORDS (OUTPATIENT)
Dept: HEALTH INFORMATION MANAGEMENT | Facility: OTHER | Age: 68
End: 2023-08-25
Payer: COMMERCIAL

## 2023-08-28 ENCOUNTER — MYC MEDICAL ADVICE (OUTPATIENT)
Dept: FAMILY MEDICINE | Facility: OTHER | Age: 68
End: 2023-08-28
Payer: COMMERCIAL

## 2023-09-08 ENCOUNTER — HOSPITAL ENCOUNTER (OUTPATIENT)
Dept: BONE DENSITY | Facility: OTHER | Age: 68
Discharge: HOME | End: 2023-09-08
Attending: FAMILY MEDICINE | Admitting: FAMILY MEDICINE
Payer: COMMERCIAL

## 2023-09-08 DIAGNOSIS — E28.39 ESTROGEN DEFICIENCY: ICD-10-CM

## 2023-09-08 PROCEDURE — 77080 DXA BONE DENSITY AXIAL: CPT

## 2023-09-18 ENCOUNTER — THERAPY VISIT (OUTPATIENT)
Dept: PHYSICAL THERAPY | Facility: OTHER | Age: 68
End: 2023-09-18
Attending: FAMILY MEDICINE
Payer: COMMERCIAL

## 2023-09-18 DIAGNOSIS — M54.6 MIDLINE THORACIC BACK PAIN, UNSPECIFIED CHRONICITY: Primary | ICD-10-CM

## 2023-09-18 DIAGNOSIS — R10.9 RIGHT FLANK PAIN: ICD-10-CM

## 2023-09-18 PROCEDURE — 97112 NEUROMUSCULAR REEDUCATION: CPT | Mod: GP

## 2023-09-18 PROCEDURE — 97140 MANUAL THERAPY 1/> REGIONS: CPT | Mod: GP

## 2023-09-18 PROCEDURE — 97161 PT EVAL LOW COMPLEX 20 MIN: CPT | Mod: GP

## 2023-09-18 NOTE — PROGRESS NOTES
PHYSICAL THERAPY EVALUATION  Type of Visit: Evaluation    See electronic medical record for Abuse and Falls Screening details.    Subjective     Pt is referred to PT due to midline thoracic back pain, unspecified chronicity (M54.6) and R flank pain (R10.9). She was seen approximately 14 months ago in PT by this therapist for this same issue. PT addressed fascial restrictions around the R kidney and intercostal nerve irritability in this same area. She did have a past hx of a fall in December of 2022 which she thought may have been contributory at that last episode of care. Notes that she has had the pain again for about the last 3-4 months. Notes that it has gradually gotten worse. Notes that the bottom of her ribs on both sides are painful if she presses on them but moreso, she is concerned with sharp stabbing pain that comes and goes in the R upper flank area.  Pain comes and goes. When present, it gets up to a 10/10 level. Yesterday, she had multiple episodes of stabbing, sharp pain. This is now lasting up to 10 minutes where it would only last a few seconds in the past. Notes she otherwise does not have any pain unless she pushes on it. When the pain comes, I cannot do anything. Pain is not predictable.  Notes that she is still doing all her normal activities otherwise. Has started doing some of her stretches again but does not get relief from them. Notes that she does not know if the PT was really helpful but did have 3-4 months of no pain. Reports no back pain, no paresthesia, no issues with her GI system or her lungs. No report of unexpected weight loss, bowel or bladder issues, consistent night pain. Aggravating factors: Not predictable. Even happens sometimes when I am sleeping. Easing factors: warm baths. I try to press against it, but just I have to wait until the pain passes.        Presenting condition or subjective complaint: Worsening pain right side  Date of onset:    exacerbation began about  6-1-23  Relevant medical history:   Past medical hx/comorbidities: unilateral hearing loss, right flank pain, benign essential hypertension, GERD, motion sickness, hx of skin squamous cell carcinoma, obesity.       Prior diagnostic imaging/testing results: X-ray    ribs and lungs, thoracic spine in 2022:  No pneumothorax or evidence of an acute right rib fracture.       Question 6 mm right lung nodule. Calcified mediastinal/hilar nodes.  Consider CT chest for further assessment.    There is levoscoliosis of the thoracic spine measuring 18 degrees  between T11 and T5. Scattered endplate degenerative osteophytosis is  noted, along with mild multilevel disc height loss.   Cervical spine degenerative changes are partially visualized.    CT of chest in 2022:  Narrative & Impression   PROCEDURE:  CT CHEST W CONTRAST.       HISTORY:  Lung nodule, 6-8mm; Pulmonary nodules       TECHNIQUE:  Contrast enhanced helical thoracic CT was performed.     COMPARISON:  3/30/2022     MEDS/CONTRAST: 100 ml isovue 370     FINDINGS:     The neck base is grossly symmetric and unremarkable in appearance.  Cardiac size is normal. There is no pericardial or pleural effusion.  The thoracic aorta is borderline dilated at 3.6 cm in the mid  descending portion. Small calcified mediastinal and right hilar nodes  likely reflect prior granulomatous infection. No suspicious thoracic  adenopathy is identified.     The central airways are patent. There is no focal consolidation or  suspicious intrapulmonary mass. A calcified benign nodule in the right  lower lobe measures 7 mm and corresponds to the recent radiographic  finding. There is subpleural scarring in the right lower lobe  associated with lateral osteophytes.         Limited views of the upper abdomen reveal no adrenal mass or acute  process. A right renal cyst appears simple. Another is only partially  visualized.     No acute right rib fracture is seen. No suspicious osseous lesions  are  seen.                                                                      IMPRESSION:     No evidence of acute rib fracture.     6 mm benign calcified right lower lobe micronodule. No further  follow-up is required.     Prior therapy history for the same diagnosis, illness or injury: Yes 2022    Prior Level of Function  Independent in all activities; intermittent issues with R flank pain    Living Environment  Social support: With a significant other or spouse   Type of home: House   Stairs to enter the home: No       Ramp: No   Stairs inside the home: Yes 16 Is there a railing: Yes   Help at home: None    Employment: No   retired      Patient goals for therapy: Unknown when pain occurs, limits activity during pain episodes    Pain assessment:  0 to 10/10 pain range. Sharp, stabbing pain in the R upper flank     Objective     Posture/Structural:      Head and Neck Position: forward head   Shoulders/scapulae:forward, R side slightly depressed   Thoracic spine:kyphotic   Scoliosis: Yes:    Lumbar lordosis: decreased      General Listening:   Standing: slow listening to the R lateral trunk; guarded   Seated: same as standing    Postural loading: Pt is protective with all loading so it is difficult to accurately assess.    Local listening: R upper QL, lower ribs posterolateral    Motility: very mild decrease in R kidney motility    Mobility/Organ specific fascial restrictions: Minimal restriction in the R Renal fascia, hepatorenal ligament.    Neural restrictions: restricted in the intercostal nerves R 8, 9, 10 laterally, 11 posteriorly.         Trunk Range of Motion:   AROM Comments:   Flexion To reach mid shin level Pt reports this is her normal   Extension decreased    Left lateral flexion Mild decrease                         Upper Extremity ROM: elevation is WFL      Positional Facet Diagnoses: asymmetry noted at T89, T910, T11-12 with right rotation but good sideglide noted--likely related to scoliosis. FRS  L T45.     Palpation: Tender and guarded in the R QL with pain behaviors, pulling away with palpation. Tender over the R lateral ribs laterally, 7-10, 11th rib posteriorly. Tender both right and left over the inferior border of the ribs.       Assessment & Plan   CLINICAL IMPRESSIONS  Medical Diagnosis: midline thoracic back pain, unspecified chronicity (M54.6) and R flank pain (R10.9)    Treatment Diagnosis: Impaired mobility due to thoracic and flank pain   Impression/Assessment: Patient is a 68 year old female with right upper flank pain complaints.  The following significant findings have been identified: Pain, Decreased joint mobility, and myosfascila restritions, intercostal nerve restrictions . These impairments interfere with their ability to perform  all activities when pain is present  as compared to previous level of function.     Clinical Decision Making (Complexity):  Clinical Presentation: Stable/Uncomplicated  Clinical Presentation Rationale: based on medical and personal factors listed in PT evaluation  Clinical Decision Making (Complexity): Low complexity    PLAN OF CARE  Treatment Interventions:  Interventions: Manual Therapy, Neuromuscular Re-education, Therapeutic Exercise    Long Term Goals     PT Goal 1  Goal Identifier: HEP  Goal Description: Pt to display independence and compliance with home exercise program so assist in self management of pain.  Target Date: 12/16/23  PT Goal 2  Goal Identifier: Flank pain  Goal Description: Pt to display improved myofascial mobility and neural mobility to allow for symmetrical postural loading and 50% less reported pain with palpation to allow her to report decrease in episodes of flank pain by 50%.      Frequency of Treatment: 1-2 times per week  Duration of Treatment: 3-6 months    Recommended to Other Professionals:  Sent message to Dr. Rogers to consider further imaging due to patient's concern that something is going on internally and to the lack of  definitive musculoskeletal conclusion for source of pain.  Education Assessment:   Learner/Method: Patient  Education Comments: eval findings, POC    Risks and benefits of evaluation/treatment have been explained.   Patient/Family/caregiver agrees with Plan of Care.     Evaluation Time:     PT Eval, Low Complexity Minutes (36733): 15       Signing Clinician: Lona Sánchez PT      [facility]                                                                                   OUTPATIENT PHYSICAL THERAPY      PLAN OF TREATMENT FOR OUTPATIENT REHABILITATION   Patient's Last Name, First Name, Benita Leahy YOB: 1955   Provider's Name   [facility]   Medical Record No.  [id number]     Onset Date:    Start of Care Date: 09/18/23     Medical Diagnosis:  midline thoracic back pain, unspecified chronicity (M54.6) and R flank pain (R10.9)      PT Treatment Diagnosis:  Impaired mobility due to thoracic and flank pain Plan of Treatment  Frequency/Duration: 1-2 times per week/ 3-6 months    Certification date from 09/18/23 to 12/16/23         See note for plan of treatment details and functional goals     Lona Sánchez, PT                         I CERTIFY THE NEED FOR THESE SERVICES FURNISHED UNDER        THIS PLAN OF TREATMENT AND WHILE UNDER MY CARE     (Physician attestation of this document indicates review and certification of the therapy plan).                Referring Provider:  Melany Rogers      Initial Assessment  See Epic Evaluation- Start of Care Date: 09/18/23

## 2023-09-20 ENCOUNTER — MYC MEDICAL ADVICE (OUTPATIENT)
Dept: FAMILY MEDICINE | Facility: OTHER | Age: 68
End: 2023-09-20
Payer: COMMERCIAL

## 2023-09-20 DIAGNOSIS — M54.6 MIDLINE THORACIC BACK PAIN, UNSPECIFIED CHRONICITY: Primary | ICD-10-CM

## 2023-09-25 ENCOUNTER — THERAPY VISIT (OUTPATIENT)
Dept: PHYSICAL THERAPY | Facility: OTHER | Age: 68
End: 2023-09-25
Attending: FAMILY MEDICINE
Payer: COMMERCIAL

## 2023-09-25 ENCOUNTER — TELEPHONE (OUTPATIENT)
Dept: FAMILY MEDICINE | Facility: OTHER | Age: 68
End: 2023-09-25

## 2023-09-25 DIAGNOSIS — M54.6 MIDLINE THORACIC BACK PAIN, UNSPECIFIED CHRONICITY: Primary | ICD-10-CM

## 2023-09-25 DIAGNOSIS — R10.9 RIGHT FLANK PAIN: ICD-10-CM

## 2023-09-25 PROCEDURE — 97112 NEUROMUSCULAR REEDUCATION: CPT | Mod: GP

## 2023-09-25 PROCEDURE — 97140 MANUAL THERAPY 1/> REGIONS: CPT | Mod: GP

## 2023-09-25 NOTE — TELEPHONE ENCOUNTER
Patient was recently here for a wellness visit and brought up R flank pain. She had this previously and symptoms resolved with physical therapy and chiro care. We opted to start with physical therapy. But it has taken awhile to get in and symptoms have gotten worse. Therapist doesn't feel like it's the same issue as last time. Minimal hx/exam on this issue due to circumstances of last visit. Will start with MRI, but differential also includes kidney pathology and other possible non MSK etiologies. Melany Rogers MD

## 2023-10-02 ENCOUNTER — THERAPY VISIT (OUTPATIENT)
Dept: PHYSICAL THERAPY | Facility: OTHER | Age: 68
End: 2023-10-02
Attending: FAMILY MEDICINE
Payer: COMMERCIAL

## 2023-10-02 DIAGNOSIS — R10.9 RIGHT FLANK PAIN: ICD-10-CM

## 2023-10-02 DIAGNOSIS — M54.6 MIDLINE THORACIC BACK PAIN, UNSPECIFIED CHRONICITY: Primary | ICD-10-CM

## 2023-10-02 PROCEDURE — 97140 MANUAL THERAPY 1/> REGIONS: CPT | Mod: GP

## 2023-10-02 PROCEDURE — 97112 NEUROMUSCULAR REEDUCATION: CPT | Mod: GP

## 2023-10-05 ENCOUNTER — HOSPITAL ENCOUNTER (OUTPATIENT)
Dept: MRI IMAGING | Facility: OTHER | Age: 68
Discharge: HOME | End: 2023-10-05
Attending: FAMILY MEDICINE | Admitting: FAMILY MEDICINE
Payer: COMMERCIAL

## 2023-10-05 DIAGNOSIS — M54.6 MIDLINE THORACIC BACK PAIN, UNSPECIFIED CHRONICITY: ICD-10-CM

## 2023-10-05 PROCEDURE — 72146 MRI CHEST SPINE W/O DYE: CPT

## 2023-10-06 ENCOUNTER — MYC MEDICAL ADVICE (OUTPATIENT)
Dept: FAMILY MEDICINE | Facility: OTHER | Age: 68
End: 2023-10-06
Payer: COMMERCIAL

## 2023-10-09 NOTE — TELEPHONE ENCOUNTER
Please use afternoon approval spot to have patient come in today for evaluation. Melany Rogers MD

## 2023-10-09 NOTE — TELEPHONE ENCOUNTER
Left message that Dr Rogers wants her to be seen. An appointment made for today with Dr Rogers at 2:20pm. Left message with appointment time and mychart sent as well.   Maryjane Granger LPN (Ext [phone] ) ..........10/9/2023 10:46 AM

## 2023-10-16 ENCOUNTER — THERAPY VISIT (OUTPATIENT)
Dept: PHYSICAL THERAPY | Facility: OTHER | Age: 68
End: 2023-10-16
Attending: FAMILY MEDICINE
Payer: COMMERCIAL

## 2023-10-16 DIAGNOSIS — M54.6 MIDLINE THORACIC BACK PAIN, UNSPECIFIED CHRONICITY: Primary | ICD-10-CM

## 2023-10-16 DIAGNOSIS — R10.9 RIGHT FLANK PAIN: ICD-10-CM

## 2023-10-16 PROCEDURE — 97140 MANUAL THERAPY 1/> REGIONS: CPT | Mod: GP

## 2023-10-16 PROCEDURE — 97112 NEUROMUSCULAR REEDUCATION: CPT | Mod: GP

## 2023-10-23 ENCOUNTER — OFFICE VISIT (OUTPATIENT)
Dept: FAMILY MEDICINE | Facility: OTHER | Age: 68
End: 2023-10-23
Attending: FAMILY MEDICINE
Payer: COMMERCIAL

## 2023-10-23 VITALS
SYSTOLIC BLOOD PRESSURE: 156 MMHG | WEIGHT: 216.8 LBS | OXYGEN SATURATION: 98 % | TEMPERATURE: 97 F | RESPIRATION RATE: 16 BRPM | HEART RATE: 54 BPM | DIASTOLIC BLOOD PRESSURE: 72 MMHG | HEIGHT: 65 IN | BODY MASS INDEX: 36.12 KG/M2

## 2023-10-23 DIAGNOSIS — R10.9 FLANK PAIN: Primary | ICD-10-CM

## 2023-10-23 LAB
ALBUMIN SERPL BCG-MCNC: 4.6 G/DL (ref 3.5–5.2)
ALBUMIN UR-MCNC: NEGATIVE MG/DL
ALP SERPL-CCNC: 65 U/L (ref 35–104)
ALT SERPL W P-5'-P-CCNC: 14 U/L (ref 0–50)
ANION GAP SERPL CALCULATED.3IONS-SCNC: 10 MMOL/L (ref 7–15)
APPEARANCE UR: CLEAR
AST SERPL W P-5'-P-CCNC: 19 U/L (ref 0–45)
BILIRUB SERPL-MCNC: 0.6 MG/DL
BILIRUB UR QL STRIP: NEGATIVE
BUN SERPL-MCNC: 14.6 MG/DL (ref 8–23)
CALCIUM SERPL-MCNC: 10.1 MG/DL (ref 8.8–10.2)
CHLORIDE SERPL-SCNC: 100 MMOL/L (ref 98–107)
COLOR UR AUTO: YELLOW
CREAT SERPL-MCNC: 0.72 MG/DL (ref 0.51–0.95)
DEPRECATED HCO3 PLAS-SCNC: 28 MMOL/L (ref 22–29)
EGFRCR SERPLBLD CKD-EPI 2021: >90 ML/MIN/1.73M2
ERYTHROCYTE [DISTWIDTH] IN BLOOD BY AUTOMATED COUNT: 13.3 % (ref 10–15)
GLUCOSE SERPL-MCNC: 91 MG/DL (ref 70–99)
GLUCOSE UR STRIP-MCNC: NEGATIVE MG/DL
HCT VFR BLD AUTO: 44.2 % (ref 35–47)
HGB BLD-MCNC: 14.8 G/DL (ref 11.7–15.7)
HGB UR QL STRIP: NEGATIVE
HOLD SPECIMEN: NORMAL
KETONES UR STRIP-MCNC: NEGATIVE MG/DL
LEUKOCYTE ESTERASE UR QL STRIP: ABNORMAL
MCH RBC QN AUTO: 29.9 PG (ref 26.5–33)
MCHC RBC AUTO-ENTMCNC: 33.5 G/DL (ref 31.5–36.5)
MCV RBC AUTO: 89 FL (ref 78–100)
MUCOUS THREADS #/AREA URNS LPF: PRESENT /LPF
NITRATE UR QL: NEGATIVE
PH UR STRIP: 5 [PH] (ref 5–9)
PLATELET # BLD AUTO: 266 10E3/UL (ref 150–450)
POTASSIUM SERPL-SCNC: 3.6 MMOL/L (ref 3.4–5.3)
PROT SERPL-MCNC: 7.3 G/DL (ref 6.4–8.3)
RBC # BLD AUTO: 4.95 10E6/UL (ref 3.8–5.2)
RBC URINE: <1 /HPF
SODIUM SERPL-SCNC: 138 MMOL/L (ref 135–145)
SP GR UR STRIP: 1.01 (ref 1–1.03)
UROBILINOGEN UR STRIP-MCNC: NORMAL MG/DL
WBC # BLD AUTO: 6.9 10E3/UL (ref 4–11)
WBC URINE: 1 /HPF

## 2023-10-23 PROCEDURE — 99214 OFFICE O/P EST MOD 30 MIN: CPT | Performed by: FAMILY MEDICINE

## 2023-10-23 PROCEDURE — 81001 URINALYSIS AUTO W/SCOPE: CPT | Mod: ZL | Performed by: FAMILY MEDICINE

## 2023-10-23 PROCEDURE — G0463 HOSPITAL OUTPT CLINIC VISIT: HCPCS

## 2023-10-23 PROCEDURE — 80053 COMPREHEN METABOLIC PANEL: CPT | Mod: ZL | Performed by: FAMILY MEDICINE

## 2023-10-23 PROCEDURE — 36415 COLL VENOUS BLD VENIPUNCTURE: CPT | Mod: ZL | Performed by: FAMILY MEDICINE

## 2023-10-23 PROCEDURE — 85027 COMPLETE CBC AUTOMATED: CPT | Mod: ZL | Performed by: FAMILY MEDICINE

## 2023-10-23 ASSESSMENT — PAIN SCALES - GENERAL: PAINLEVEL: NO PAIN (0)

## 2023-10-23 NOTE — NURSING NOTE
"Chief Complaint   Patient presents with    RECHECK     Follow up from MRI and on going pain.        Initial BP (!) 160/78 (BP Location: Right arm, Patient Position: Sitting, Cuff Size: Adult Regular)   Pulse 54   Temp 97  F (36.1  C) (Temporal)   Resp 16   Ht 1.651 m (5' 5\")   Wt 98.3 kg (216 lb 12.8 oz)   LMP  (LMP Unknown)   SpO2 98%   BMI 36.08 kg/m   Estimated body mass index is 36.08 kg/m  as calculated from the following:    Height as of this encounter: 1.651 m (5' 5\").    Weight as of this encounter: 98.3 kg (216 lb 12.8 oz).  Medication Reconciliation: complete    Maryjane Granger LPN    Advance Care Directive reviewed    "

## 2023-10-23 NOTE — PROGRESS NOTES
Assessment & Plan       ICD-10-CM    1. Flank pain  R10.9 CT Abdomen Pelvis w/o & w Contrast     UA reflex to Microscopic     Comprehensive Metabolic Panel     CBC W PLT No Diff     UA reflex to Microscopic     Comprehensive Metabolic Panel     CBC W PLT No Diff     CANCELED: Creatinine        Will proceed with CT of the abd/pelvis looking for internal pathology that could explain symptoms, particularly causes of renal colic. Labs today also. If all negative, consider chiro added to physical therapy.       No follow-ups on file.    Melany Rogers MD  [facility]    Subjective   Viki is a 68 year old, presenting for the following health issues:  RECHECK (Follow up from MRI and on going pain. )        10/23/2023    11:41 AM   Additional Questions   Roomed by Maryjane   Accompanied by self       History of Present Illness       Reason for visit:  Right flank pain    She eats 2-3 servings of fruits and vegetables daily.She consumes 0 sweetened beverage(s) daily.She exercises with enough effort to increase her heart rate 30 to 60 minutes per day.  She exercises with enough effort to increase her heart rate 5 days per week.   She is taking medications regularly.     Patient initially presented with R sided flank pain 3/22 which ultimately resolved, presumably improved with physical therapy.     Last spring, developed similar symptoms. She mentioned this at her medicare wellness visit in July so we decided to try physical therapy. Symptoms did not really respond.     She describes episodic R sided flank pain. Most recently she had 8 severe episode on 10/6 lasting 4-10 min each. Pain is constant and severe when present then completely resolves between episodes.     1 episode 10/7. Woke her up from sleep 10/21 lasting a couple minutes. But nothing else in the past month.     Pain is always in the same place. She can find a tender spot. No known triggers or associated symptoms. No obvious palliative  "factors.     No blood in urine. Had urinary frequency last spring. Her 's co-worker (he's a psychiatric NP) prescribed amoxicillin. UA not done.     Recent MRI of the thoracic spine with some findings that could potentially be contributing.     Normal liver testing 3/22.    Normal colonoscopy 2015        Objective    BP (!) 156/72   Pulse 54   Temp 97  F (36.1  C) (Temporal)   Resp 16   Ht 1.651 m (5' 5\")   Wt 98.3 kg (216 lb 12.8 oz)   LMP  (LMP Unknown)   SpO2 98%   BMI 36.08 kg/m    Body mass index is 36.08 kg/m .  Physical Exam  Constitutional:       Appearance: She is well-developed.   HENT:      Right Ear: External ear normal.      Left Ear: External ear normal.   Eyes:      General: No scleral icterus.     Conjunctiva/sclera: Conjunctivae normal.   Cardiovascular:      Rate and Rhythm: Normal rate.   Pulmonary:      Effort: Pulmonary effort is normal. No respiratory distress.   Musculoskeletal:      Comments: Pain with palpation over R lower ribs.    Skin:     Findings: No rash.   Neurological:      Mental Status: She is alert.                           "

## 2023-10-26 ENCOUNTER — HOSPITAL ENCOUNTER (OUTPATIENT)
Dept: CT IMAGING | Facility: OTHER | Age: 68
Discharge: HOME | End: 2023-10-26
Attending: FAMILY MEDICINE | Admitting: FAMILY MEDICINE
Payer: COMMERCIAL

## 2023-10-26 DIAGNOSIS — R10.9 FLANK PAIN: ICD-10-CM

## 2023-10-26 PROCEDURE — 74178 CT ABD&PLV WO CNTR FLWD CNTR: CPT

## 2023-10-26 PROCEDURE — 250N000011 HC RX IP 250 OP 636: Performed by: FAMILY MEDICINE

## 2023-10-26 RX ORDER — IOPAMIDOL 755 MG/ML
124 INJECTION, SOLUTION INTRAVASCULAR ONCE
Status: COMPLETED | OUTPATIENT
Start: 2023-10-26 | End: 2023-10-26

## 2023-10-26 RX ADMIN — IOPAMIDOL 124 ML: 755 INJECTION, SOLUTION INTRAVENOUS at 11:52

## 2023-10-26 NOTE — PROGRESS NOTES
1.  Has the patient had a previous reaction to IV contrast? No    2.  Does the patient have kidney disease? No    3.  Is the patient on dialysis? No    If YES to any of these questions, exam will be reviewed with a Radiologist before administering contrast.    IV Contrast- Discharge Instructions After Your CT Scan      The IV contrast you received today will be filtered from your bloodstream by your kidneys during the next 24 hours and pass from the body in urine.  You will not be aware of this process and your urine will not change in color.  To help this process you should drink at least 4 additional glasses of water or juice today.  This reduces stress on your kidneys.    Most contrast reactions are immediate.  Should you develop symptoms of concern after discharge, contact the department at the number below.  After hours you should contact your personal physician.  If you develop breathing distress or wheezing, call 911.

## 2023-10-27 DIAGNOSIS — K21.9 GASTROESOPHAGEAL REFLUX DISEASE WITHOUT ESOPHAGITIS: ICD-10-CM

## 2023-10-27 DIAGNOSIS — R10.9 FLANK PAIN: Primary | ICD-10-CM

## 2023-10-27 DIAGNOSIS — M54.6 MIDLINE THORACIC BACK PAIN, UNSPECIFIED CHRONICITY: ICD-10-CM

## 2023-10-31 ENCOUNTER — MYC MEDICAL ADVICE (OUTPATIENT)
Dept: FAMILY MEDICINE | Facility: OTHER | Age: 68
End: 2023-10-31
Payer: COMMERCIAL

## 2023-10-31 ENCOUNTER — TELEPHONE (OUTPATIENT)
Dept: SURGERY | Facility: OTHER | Age: 68
End: 2023-10-31
Payer: COMMERCIAL

## 2023-10-31 NOTE — TELEPHONE ENCOUNTER
[facility] Diagnostic Referral    Patient has a referral for an EGD with a diagnosis of Gastroesophageal reflux disease without esophagitis.    Please advise.    Thank you,  Zunilda Hernandes on 10/31/2023 at 9:49 AM

## 2023-11-07 ENCOUNTER — TELEPHONE (OUTPATIENT)
Dept: SURGERY | Facility: OTHER | Age: 68
End: 2023-11-07

## 2023-11-07 ENCOUNTER — HOSPITAL ENCOUNTER (OUTPATIENT)
Facility: OTHER | Age: 68
End: 2023-11-07
Attending: SURGERY | Admitting: SURGERY
Payer: COMMERCIAL

## 2023-11-07 NOTE — TELEPHONE ENCOUNTER
Screening Questions for the Scheduling of Screening Colonoscopies   (If Colonoscopy is diagnostic, Provider should review the chart before scheduling.)  Are you younger than 45 or older than 80?  NO  Do you take aspirin or fish oil?  ASPIRIN AND FISH OIL (if yes, tell patient to stop 1 week prior to Colonoscopy)  Do you take warfarin (Coumadin), clopidogrel (Plavix), apixaban (Eliquis), dabigatram (Pradaxa), rivaroxaban (Xarelto) or any blood thinner? NO  Do you take Ozempic? NO  Do you use oxygen or a CPAP at home?  NO  Do you have kidney disease? NO  Are you on dialysis? NO  Have you had a stroke or heart attack in the last year? NO  Have you had a stent in your heart or any blood vessel in the last year? NO  Have you had a transplant of any organ? NO  Have you had a colonoscopy or upper endoscopy (EGD) before? NO  Date of scheduled EGD. 12/08/2023  Provider GRANT  Pharmacy [facility]

## 2023-12-06 NOTE — PROGRESS NOTES
12/6/2023   Physical Therapy Discharge Summary    S: Pt was last seen in PT on 10-16-23. She then saw Dr. Rogers who proceeded to order an abdominal CT scan. It appears that pt is now scheduled for an upper endoscopy due to GERD. Current subjective status is not known.   O: refer to information below for   clinical findings at the time of her last visit. Current status unknown due to unplanned discharge  A: progress toward goals unknown.         DISCHARGE  Reason for Discharge: Patient has failed to schedule further appointments.      Discharge Plan: Patient to continue home program.    Referring Provider:  Melany Rogers     10/16/23 0500   Appointment Info   Signing clinician's name / credentials Lona Sánchez, PT   Visits Used 4   Medical Diagnosis midline thoracic back pain, unspecified chronicity (M54.6) and R flank pain (R10.9)   PT Tx Diagnosis Impaired mobility due to thoracic and flank pain   Quick Adds Certification   Progress Note/Certification   Start of Care Date 09/18/23   Therapy Frequency 1-2 times per week   Predicted Duration 3-6 months   Certification date from 09/18/23   Certification date to 12/16/23   Progress Note Due Date 12/16/23   GOALS   PT Goals 2;3   PT Goal 1   Goal Identifier HEP   Goal Description Pt to display independence and compliance with home exercise program so assist in self management of pain.   Target Date 12/16/23   PT Goal 2   Goal Identifier Flank pain   Goal Description Pt to display improved myofascial mobility and neural mobility to allow for symmetrical postural loading and 50% less reported pain with palpation to allow her to report decrease in episodes of flank pain by 50%.   Subjective Report   Subjective Report Notes that she had a really terrible week after she was last in. Notes that she had 3 episodes on Saturday and has had no pain since. Notes that she sees Dr. Rogers a week from today. Pt is thinking that she may have renal cysts that are just popping. MRI was  completed of the thoracic spine: Impression:   Mild leftward curvature of the thoracic spine centered at T8-9.     Probably cyst in R kidney was noted.   Objective Measures   Objective Measures Objective Measure 1;Objective Measure 2;Objective Measure 3   Objective Measure 1   Objective Measure Postural loading   Details General listening to the R lateral ribcage. Postural loading: head: limited in all planes, shoulders R/R, L/R, pelvis mild R/R. Limited excursion in general with loading.   Objective Measure 2   Objective Measure myofascial   Details Myofascial restriction in the R QL, R intercostals 7-10. Decreased compressive loading of the R lower ribcage. Mild to moderate restriction in the R renal fascia with line of tension to the R QL. Good motility at the kidneys.   Objective Measure 3   Objective Measure neural   Details not tested   Treatment Interventions (PT)   Interventions Therapeutic Procedure/Exercise;Neuromuscular Re-education;Manual Therapy   Therapeutic Procedure/Exercise   Ther Proc 1 ther ex   Neuromuscular Re-education   Neuromuscular re-ed of mvmt, balance, coord, kinesthetic sense, posture, proprioception minutes (73007) 20   Neuro Re-ed 1 Induction/POF   Neuro Re-ed 1 - Details Viscoelastic release R lower ribcage; double compression release to R lower ribcage. POF kidneys   Skilled Intervention To promote improved pain control, myofascial, joint and nerve mobility.   Patient Response/Progress less tender today   Manual Therapy   Manual Therapy: Mobilization, MFR, MLD, friction massage minutes (02536) 15   Manual Therapy 1 MFR/Organ specific fascial mobilization   Manual Therapy 1 - Details R QL, R renal fascia, respiratory diaphragm.   Skilled Intervention To promote improved flexiblity, ROM, myofascial and joint mobility.   Patient Response/Progress Tender, guarded in R QL.   Education   Learner/Method Patient   Education Comments eval findings, POC   Plan   Home program Supine QL, SL  trunk elongation with arm overhead, breathing with lateral expansion   Plan for next session Continue assessment, manual therapy, N-M re-ed, therapeutic exercise and HEP   Total Session Time   Timed Code Treatment Minutes 35   Total Treatment Time (sum of timed and untimed services) 35     Lona Sánchez, PT on 12/6/2023 at 12:28 PM

## 2024-01-30 ENCOUNTER — HOSPITAL ENCOUNTER (OUTPATIENT)
Facility: OTHER | Age: 69
Discharge: HOME | End: 2024-01-30
Attending: SURGERY | Admitting: SURGERY
Payer: COMMERCIAL

## 2024-01-30 ENCOUNTER — ANESTHESIA EVENT (OUTPATIENT)
Dept: SURGERY | Facility: OTHER | Age: 69
End: 2024-01-30
Payer: COMMERCIAL

## 2024-01-30 ENCOUNTER — ANESTHESIA (OUTPATIENT)
Dept: SURGERY | Facility: OTHER | Age: 69
End: 2024-01-30
Payer: COMMERCIAL

## 2024-01-30 VITALS
TEMPERATURE: 97.2 F | WEIGHT: 210 LBS | DIASTOLIC BLOOD PRESSURE: 64 MMHG | SYSTOLIC BLOOD PRESSURE: 157 MMHG | RESPIRATION RATE: 16 BRPM | BODY MASS INDEX: 34.95 KG/M2 | OXYGEN SATURATION: 98 % | HEART RATE: 50 BPM

## 2024-01-30 PROCEDURE — 43239 EGD BIOPSY SINGLE/MULTIPLE: CPT | Performed by: SURGERY

## 2024-01-30 PROCEDURE — 250N000011 HC RX IP 250 OP 636: Performed by: NURSE ANESTHETIST, CERTIFIED REGISTERED

## 2024-01-30 PROCEDURE — 258N000003 HC RX IP 258 OP 636: Performed by: SURGERY

## 2024-01-30 PROCEDURE — 999N000010 HC STATISTIC ANES STAT CODE-CRNA PER MINUTE: Performed by: SURGERY

## 2024-01-30 PROCEDURE — 250N000009 HC RX 250: Performed by: NURSE ANESTHETIST, CERTIFIED REGISTERED

## 2024-01-30 PROCEDURE — 88305 TISSUE EXAM BY PATHOLOGIST: CPT

## 2024-01-30 RX ORDER — LIDOCAINE HYDROCHLORIDE 20 MG/ML
INJECTION, SOLUTION INFILTRATION; PERINEURAL PRN
Status: DISCONTINUED | OUTPATIENT
Start: 2024-01-30 | End: 2024-01-30

## 2024-01-30 RX ORDER — NALOXONE HYDROCHLORIDE 0.4 MG/ML
0.2 INJECTION, SOLUTION INTRAMUSCULAR; INTRAVENOUS; SUBCUTANEOUS
Status: DISCONTINUED | OUTPATIENT
Start: 2024-01-30 | End: 2024-01-30 | Stop reason: HOSPADM

## 2024-01-30 RX ORDER — SODIUM CHLORIDE, SODIUM LACTATE, POTASSIUM CHLORIDE, CALCIUM CHLORIDE 600; 310; 30; 20 MG/100ML; MG/100ML; MG/100ML; MG/100ML
INJECTION, SOLUTION INTRAVENOUS CONTINUOUS
Status: DISCONTINUED | OUTPATIENT
Start: 2024-01-30 | End: 2024-01-30 | Stop reason: HOSPADM

## 2024-01-30 RX ORDER — LIDOCAINE 40 MG/G
CREAM TOPICAL
Status: DISCONTINUED | OUTPATIENT
Start: 2024-01-30 | End: 2024-01-30 | Stop reason: HOSPADM

## 2024-01-30 RX ORDER — PROPOFOL 10 MG/ML
INJECTION, EMULSION INTRAVENOUS PRN
Status: DISCONTINUED | OUTPATIENT
Start: 2024-01-30 | End: 2024-01-30

## 2024-01-30 RX ORDER — NALOXONE HYDROCHLORIDE 0.4 MG/ML
0.4 INJECTION, SOLUTION INTRAMUSCULAR; INTRAVENOUS; SUBCUTANEOUS
Status: DISCONTINUED | OUTPATIENT
Start: 2024-01-30 | End: 2024-01-30 | Stop reason: HOSPADM

## 2024-01-30 RX ORDER — FLUMAZENIL 0.1 MG/ML
0.2 INJECTION, SOLUTION INTRAVENOUS
Status: DISCONTINUED | OUTPATIENT
Start: 2024-01-30 | End: 2024-01-30 | Stop reason: HOSPADM

## 2024-01-30 RX ORDER — PROPOFOL 10 MG/ML
INJECTION, EMULSION INTRAVENOUS CONTINUOUS PRN
Status: DISCONTINUED | OUTPATIENT
Start: 2024-01-30 | End: 2024-01-30

## 2024-01-30 RX ADMIN — PROPOFOL 140 MCG/KG/MIN: 10 INJECTION, EMULSION INTRAVENOUS at 08:32

## 2024-01-30 RX ADMIN — SODIUM CHLORIDE, POTASSIUM CHLORIDE, SODIUM LACTATE AND CALCIUM CHLORIDE: 600; 310; 30; 20 INJECTION, SOLUTION INTRAVENOUS at 08:12

## 2024-01-30 RX ADMIN — PROPOFOL 80 MG: 10 INJECTION, EMULSION INTRAVENOUS at 08:32

## 2024-01-30 RX ADMIN — LIDOCAINE HYDROCHLORIDE 60 MG: 20 INJECTION, SOLUTION INFILTRATION; PERINEURAL at 08:32

## 2024-01-30 ASSESSMENT — ACTIVITIES OF DAILY LIVING (ADL): ADLS_ACUITY_SCORE: 33

## 2024-01-30 NOTE — OR NURSING
patient has been discharged to home at 1025 via ambulation accompanied by daughter    Written discharge instructions were provided to patient.  Prescriptions were none.      Patient and adult caring for them verbalize understanding of discharge instructions including no driving until tomorrow and no longer taking narcotic pain medications - no operating mechanical equipment and no making any important decisions.They understand reason for discharge, and necessary follow-up appointments.

## 2024-01-30 NOTE — ANESTHESIA PREPROCEDURE EVALUATION
Anesthesia Pre-Procedure Evaluation    Patient: Benita Padilla   MRN: [id number] : 1955        Procedure : Procedure(s):  Esophagoscopy, gastroscopy, duodenoscopy (EGD), combined          History reviewed. No pertinent past medical history.   History reviewed. No pertinent surgical history.   No Known Allergies   Social History     Tobacco Use     Smoking status: Never     Smokeless tobacco: Never   Substance Use Topics     Alcohol use: Yes     Comment: once a week maybe      Wt Readings from Last 1 Encounters:   24 95.3 kg (210 lb)        Anesthesia Evaluation   Pt has had prior anesthetic.     No history of anesthetic complications       ROS/MED HX  ENT/Pulmonary:       Neurologic:       Cardiovascular:     (+) Dyslipidemia hypertension- -   -  - -                                      METS/Exercise Tolerance: >4 METS    Hematologic:       Musculoskeletal:       GI/Hepatic:     (+) GERD, Asymptomatic on medication,                  Renal/Genitourinary:       Endo:       Psychiatric/Substance Use:       Infectious Disease:       Malignancy:       Other:          Physical Exam    Airway  airway exam normal      Mallampati: II   TM distance: > 3 FB   Neck ROM: full   Mouth opening: > 3 cm    Respiratory Devices and Support         Dental       (+) Modest Abnormalities - crowns, retainers, 1 or 2 missing teeth      Cardiovascular   cardiovascular exam normal       Rhythm and rate: regular and normal     Pulmonary   pulmonary exam normal        breath sounds clear to auscultation       OUTSIDE LABS:  CBC:   Lab Results   Component Value Date    WBC 6.9 10/23/2023    HGB 14.8 10/23/2023    HCT 44.2 10/23/2023     10/23/2023     BMP:   Lab Results   Component Value Date     10/23/2023     07/10/2023    POTASSIUM 3.6 10/23/2023    POTASSIUM 4.0 07/10/2023    CHLORIDE 100 10/23/2023    CHLORIDE 103 07/10/2023    CO2 28 10/23/2023    CO2 30 (H) 07/10/2023    BUN 14.6 10/23/2023    BUN 16.0  "07/10/2023    CR 0.72 10/23/2023    CR 0.78 07/10/2023    GLC 91 10/23/2023     (H) 07/10/2023     COAGS: No results found for: \"PTT\", \"INR\", \"FIBR\"  POC: No results found for: \"BGM\", \"HCG\", \"HCGS\"  HEPATIC:   Lab Results   Component Value Date    ALBUMIN 4.6 10/23/2023    PROTTOTAL 7.3 10/23/2023    ALT 14 10/23/2023    AST 19 10/23/2023    ALKPHOS 65 10/23/2023    BILITOTAL 0.6 10/23/2023     OTHER:   Lab Results   Component Value Date    A1C 5.5 07/10/2023    ABDULKADIR 10.1 10/23/2023       Anesthesia Plan    ASA Status:  2    NPO Status:  NPO Appropriate    Anesthesia Type: MAC.     - Reason for MAC: straight local not clinically adequate   Induction: Intravenous.   Maintenance: Balanced.        Consents    Anesthesia Plan(s) and associated risks, benefits, and realistic alternatives discussed. Questions answered and patient/representative(s) expressed understanding.     - Discussed: Risks, Benefits and Alternatives for BOTH SEDATION and the PROCEDURE were discussed     - Discussed with:  Patient            Postoperative Care            Comments:    Other Comments: Risks, benefits and alternatives discussed and would like to proceed. General anesthesia ok if indicated.              KINGS Jordan CRNA    I have reviewed the pertinent notes and labs in the chart from the past 30 days and (re)examined the patient.  Any updates or changes from those notes are reflected in this note.             # Drug Induced Platelet Defect: home medication list includes an antiplatelet medication      "

## 2024-01-30 NOTE — DISCHARGE INSTRUCTIONS
[facility]  Adult Discharge Orders & Instructions    ________________________________________________________________          For 12 hours after surgery  Get plenty of rest.  A responsible adult must stay with you for at least 12 hours after you leave the hospital.   You may feel lightheaded.  IF so, sit for a few minutes before standing.  Have someone help you get up.   You may have a slight fever. Call the doctor if your fever is over 101 F (38.3 C) (taken under the tongue) or lasts longer than 24 hours.  You may have a dry mouth, a sore throat, muscle aches or trouble sleeping.  These should go away after 24 hours.  Do not make important or legal decisions.  6.   Do not drive or use heavy equipment.  If you have weakness or tingling, don't drive or use heavy equipment until this feeling goes away.    To contact a doctor, call   [phone]_______________________     Random biopsies performed

## 2024-01-30 NOTE — H&P
GENERAL SURGERY CONSULTATION NOTE    Benita Padilla   [address] [place] [address]  68 year old  female    Primary Care Provider:  Melany Rogers      HPI: Benita Padilla presents to day surgery in need of EGD for right flank pain.   Benita Padilla had well controlled GERD and right upper abdominal pain and flank pain.     REVIEW OF SYSTEMS:    GENERAL: No fevers or chills. Denies fatigue, recent weight loss.  HEENT: No sinus drainage. No changes with vision or hearing. No difficulty swallowing.   LYMPHATICS:  Noswollen nodes in axilla, neck or groin.  CARDIOVASCULAR: Denies chest pain, palpitations and dyspnea on exertion.  PULMONARY: No shortness of breath or cough. No increase in sputum production.  GI: Denies melena,bright red blood in stools. No hematemesis. No constipation or diarrhea.  : No dysuria or hematuria.  SKIN: No recent rashes or ulcers.   HEMATOLOGY:  No history of easy bruising or bleeding.  ENDOCRINE:  No history of diabetes or thyroid problems.  NEUROLOGY:  No history of seizures or headaches. No motor or sensory changes.        Patient Active Problem List   Diagnosis    Actinic keratosis    Gastroesophageal reflux disease    History of disease    History of squamous cell carcinoma of skin    Primary hypertension    Mixed hyperlipidemia    Morbid obesity (H)    Squamous cell carcinoma of skin    Midline thoracic back pain, unspecified chronicity    Right flank pain       History reviewed. No pertinent past medical history.    History reviewed. No pertinent surgical history.    History reviewed. No pertinent family history.    Social History     Social History Narrative    Not on file       Social History     Socioeconomic History    Marital status:      Spouse name: Not on file    Number of children: Not on file    Years of education: Not on file    Highest education level: Not on file   Occupational History    Not on file   Tobacco Use    Smoking status: Never    Smokeless  tobacco: Never   Vaping Use    Vaping Use: Never used   Substance and Sexual Activity    Alcohol use: Yes     Comment: once a week maybe    Drug use: Never    Sexual activity: Yes     Partners: Male     Birth control/protection: None   Other Topics Concern    Not on file   Social History Narrative    Not on file     Social Determinants of Health     Financial Resource Strain: Low Risk  (10/22/2023)    Financial Resource Strain     Within the past 12 months, have you or your family members you live with been unable to get utilities (heat, electricity) when it was really needed?: No   Food Insecurity: Low Risk  (10/22/2023)    Food Insecurity     Within the past 12 months, did you worry that your food would run out before you got money to buy more?: No     Within the past 12 months, did the food you bought just not last and you didn t have money to get more?: No   Transportation Needs: Low Risk  (10/22/2023)    Transportation Needs     Within the past 12 months, has lack of transportation kept you from medical appointments, getting your medicines, non-medical meetings or appointments, work, or from getting things that you need?: No   Physical Activity: Not on file   Stress: Not on file   Social Connections: Not on file   Interpersonal Safety: Low Risk  (10/23/2023)    Interpersonal Safety     Do you feel physically and emotionally safe where you currently live?: Yes     Within the past 12 months, have you been hit, slapped, kicked or otherwise physically hurt by someone?: No     Within the past 12 months, have you been humiliated or emotionally abused in other ways by your partner or ex-partner?: No   Housing Stability: Low Risk  (10/22/2023)    Housing Stability     Do you have housing? : Yes     Are you worried about losing your housing?: No       No current facility-administered medications on file prior to encounter.  Ascorbic Acid (VITAMIN C) 500 MG CAPS, Take 1 capsule by mouth daily  aspirin (ASA) 81 MG  chewable tablet, Take 81 mg by mouth daily  biotin 1000 MCG TABS tablet, Take 5,000 mcg by mouth daily  CALCIUM CARB-MAGNESIUM CARB PO, Take 1 tablet by mouth 2 times daily  Coenzyme Q10 (CO Q 10) 10 MG CAPS, Take 200 mg by mouth daily  fish oil-omega-3 fatty acids 1000 MG capsule, Take 2 g by mouth daily  hydrochlorothiazide (HYDRODIURIL) 25 MG tablet, TAKE 1 TABLET (25 MG) BY MOUTH DAILY TAKE 1 TABLET (25 MG TOTAL) BY MOUTH DAILY.  metoprolol tartrate (LOPRESSOR) 50 MG tablet, TAKE 1 TABLET (50 MG) BY MOUTH 2 TIMES DAILY  omeprazole (PRILOSEC) 20 MG DR capsule, daily  ondansetron (ZOFRAN) 4 MG tablet, TAKE 1 TAB BY MOUTH EVERY 8HRS AS NEEDED FOR NAUSEA AND VOMITING (Patient taking differently: Car sickness)  Vitamin D, Cholecalciferol, 25 MCG (1000 UT) TABS, Take 2 tablets by mouth daily          ALLERGIES/SENSITIVITIES: No Known Allergies    PHYSICAL EXAM:     BP (!) 179/87   Pulse 61   Temp 98.4  F (36.9  C) (Tympanic)   Resp 16   Wt 95.3 kg (210 lb)   LMP  (LMP Unknown)   SpO2 98%   BMI 34.95 kg/m      General Appearance:   Sitting up in bed, no apparent distress  HEENT: Pupils are equal and reactive, no scleral icterus   Heart & CV:  RRR, no murmur.  LUNGS: No increased work of breathing. Lungs are CTA B/L, no wheezing or crackles.  Abd:  soft, non-tender, no masses   Ext: no lower extremity edema   Neuro: alert and oriented, normal speech and mentation         CONSULTATION ASSESSMENT AND PLAN:    68 year old female with abdominal pain.      The technical details of EGD were discussed with the patient along with the risks and benefits to include bleeding, perforation and aspiration. Benita Padilla demonstrated understanding and is willing to proceed.       Luis Navarro MD on 1/30/2024 at 8:22 AM

## 2024-01-30 NOTE — OP NOTE
PROCEDURE NOTE    DATE OF SERVICE: 1/30/2024    SURGEON: KEEGAN Navarro MD     PRE-OP DIAGNOSIS:  abdominal pain     POST-OP DIAGNOSIS:  hiatal hernia    PROCEDURE:   EGD with biopsy    ASSISTANT:  Delmaulator: Eros Wallace RN  Scrub Person: Jacob Evan GISSELLE    ANESTHESIA:  MAC                            MACCR Independent: Mckinley Wade APRN CRNA    INDICATION FOR THE PROCEDURE: Benita Padilla is a 68 year old female. The patient presents with abdominal pain.     PROCEDURE:The patient was taken to the endoscopy suite. Appropriate monitors were attached. The patient was placed in the left lateral decubitus position. Bite block was positioned.Timeout was performed confirming the patient's identity and procedure to be performed. After appropriate sedation was confirmed, the flexible endoscope was advanced into the oropharynx. The posterior oropharynx appeared grossly normal. The scope was advanced into the proximal esophagus. The esophagus was insufflated with air. The scope was advanced under direct visualization. No acute abnormalities of the esophagus were noted. The scope was advanced into the stomach. The scope was advanced through the pylorus into the duodenal bulb. The bulb and distal duodenum appeared grossly normal. Biopsies were taken form the duodenum with cold forceps. The scope was withdrawn back into the stomach. Antral biopsy was obtained and sent to pathology. The scope was retroflexed and the GE junction inspected. 2 cm x 2cm hiatal hernia noted. The scope was returned to a neutral position and the stomach was decompressed. The scope was withdrawn to the GE junction and biopsy obtained. The mucosa of the esophagus was inspected while withdrawing the scope. No abnormalities were noted. The scope was withdrawn from the patient. The bite block was removed. The patient tolerated the procedure with no immediately apparent complication. The patient was taken to recovery instable condition.      ESTIMATED BLOOD LOSS: none    COMPLICATIONS:  None    TISSUE REMOVED:  Yes    RECOMMEND:    Continue PPI   Follow up biopsies       KEEGAN Navarro MD

## 2024-01-30 NOTE — ANESTHESIA CARE TRANSFER NOTE
Patient: Benita Padilla    Procedure: Procedure(s):  ESOPHAGOGASTRODUODENOSCOPY, WITH BIOPSY       Diagnosis: Gastroesophageal reflux disease without esophagitis [K21.9]  Diagnosis Additional Information: No value filed.    Anesthesia Type:   MAC     Note:    Oropharynx: oropharynx clear of all foreign objects and spontaneously breathing  Level of Consciousness: drowsy  Oxygen Supplementation: nasal cannula  Level of Supplemental Oxygen (L/min / FiO2): 3  Independent Airway: airway patency satisfactory and stable  Dentition: dentition unchanged  Vital Signs Stable: post-procedure vital signs reviewed and stable  Report to RN Given: handoff report given  Patient transferred to: Phase II    Handoff Report: Identifed the Patient, Identified the Reponsible Provider, Reviewed the pertinent medical history, Discussed the surgical course, Reviewed Intra-OP anesthesia mangement and issues during anesthesia, Set expectations for post-procedure period and Allowed opportunity for questions and acknowledgement of understanding      Vitals:  Vitals Value Taken Time   BP     Temp     Pulse     Resp     SpO2 94 % 01/30/24 0848   Vitals shown include unfiled device data.    Electronically Signed By: KINGS PARK [facility]  January 30, 2024  8:49 AM

## 2024-01-30 NOTE — ANESTHESIA POSTPROCEDURE EVALUATION
Patient: Benita Padilla    Procedure: Procedure(s):  ESOPHAGOGASTRODUODENOSCOPY, WITH BIOPSY       Anesthesia Type:  MAC    Note:  Disposition: Outpatient   Postop Pain Control: Uneventful            Sign Out: Well controlled pain   PONV: No   Neuro/Psych: Uneventful            Sign Out: Acceptable/Baseline neuro status   Airway/Respiratory: Uneventful            Sign Out: Acceptable/Baseline resp. status   CV/Hemodynamics: Uneventful            Sign Out: Acceptable CV status; No obvious hypovolemia; No obvious fluid overload   Other NRE: NONE   DID A NON-ROUTINE EVENT OCCUR? No           Last vitals:  Vitals Value Taken Time   /64 01/30/24 0915   Temp 97.2  F (36.2  C) 01/30/24 0900   Pulse 50 01/30/24 0915   Resp 16 01/30/24 0915   SpO2 98 % 01/30/24 0918   Vitals shown include unfiled device data.    Electronically Signed By: KINGS PARK CRNA  January 30, 2024  10:38 AM

## 2024-02-01 LAB
PATH REPORT.COMMENTS IMP SPEC: NORMAL
PATH REPORT.FINAL DX SPEC: NORMAL
PATH REPORT.RELEVANT HX SPEC: NORMAL
PHOTO IMAGE: NORMAL

## 2024-06-07 DIAGNOSIS — I10 BENIGN ESSENTIAL HYPERTENSION: ICD-10-CM

## 2024-06-09 DIAGNOSIS — T75.3XXD MOTION SICKNESS, SUBSEQUENT ENCOUNTER: ICD-10-CM

## 2024-06-11 ENCOUNTER — MYC REFILL (OUTPATIENT)
Dept: FAMILY MEDICINE | Facility: OTHER | Age: 69
End: 2024-06-11
Payer: COMMERCIAL

## 2024-06-11 DIAGNOSIS — T75.3XXD MOTION SICKNESS, SUBSEQUENT ENCOUNTER: ICD-10-CM

## 2024-06-11 RX ORDER — METOPROLOL TARTRATE 50 MG
50 TABLET ORAL 2 TIMES DAILY
Qty: 180 TABLET | Refills: 3 | Status: SHIPPED | OUTPATIENT
Start: 2024-06-11

## 2024-06-11 RX ORDER — HYDROCHLOROTHIAZIDE 25 MG/1
25 TABLET ORAL DAILY
Qty: 90 TABLET | Refills: 3 | Status: SHIPPED | OUTPATIENT
Start: 2024-06-11

## 2024-06-11 RX ORDER — ONDANSETRON 4 MG/1
TABLET, FILM COATED ORAL
Qty: 20 TABLET | Refills: 1 | Status: CANCELLED | OUTPATIENT
Start: 2024-06-11

## 2024-06-11 NOTE — TELEPHONE ENCOUNTER
[facility] sent Rx request for the following:      Requested Prescriptions   Pending Prescriptions Disp Refills    hydrochlorothiazide (HYDRODIURIL) 25 MG tablet [Pharmacy Med Name: HYDROCHLOROTHIAZIDE 25 MG TAB] 90 tablet 3     Sig: TAKE 1 TABLET (25 MG) BY MOUTH DAILY       Diuretics (Including Combos) Protocol Failed - 6/11/2024  1:36 PM    metoprolol tartrate (LOPRESSOR) 50 MG tablet [Pharmacy Med Name: METOPROLOL TARTRATE 50 MG TAB] 180 tablet 3     Sig: TAKE 1 TABLET BY MOUTH TWICE A DAY       Beta-Blockers Protocol Failed - 6/11/2024  1:36 PM     Last Prescription Date:   6/15/23  Last Fill Qty/Refills:         180, 90, R-3    Last Office Visit:              7/10/23   Future Office visit:             Next 5 appointments (look out 90 days)      Jul 17, 2024  9:00 AM  (Arrive by 8:45 AM)  PHYSICAL with Melany Rogers MD  [facility] ([facility] ) [address]  [place] [address]  [phone]         Araceli Ramirez RN on 6/11/2024 at 1:39 PM

## 2024-06-12 RX ORDER — ONDANSETRON 4 MG/1
TABLET, FILM COATED ORAL
Qty: 20 TABLET | Refills: 1 | Status: SHIPPED | OUTPATIENT
Start: 2024-06-12 | End: 2024-07-17

## 2024-06-12 NOTE — TELEPHONE ENCOUNTER
ondansetron (ZOFRAN) 4 MG tablet 20 tablet 1 6/12/2024 -- No   Sig: TAKE 1 TAB BY MOUTH EVERY 8HRS AS NEEDED FOR NAUSEA AND VOMITING   Sent to pharmacy as: Ondansetron HCl 4 MG Oral Tablet (ZOFRAN)   Class: E-Prescribe   Order: [id number]   E-Prescribing Status: Receipt confirmed by pharmacy (6/12/2024  1:57 PM CDT)   No prior authorization was found for this prescription.   Found prior authorization for another prescription for the same medication: Canceled - Other     [id number] [place], [place] - [address]     Pt informed of prescription via 2d2chart. Celia Medel RN .............. 6/12/2024  2:13 PM

## 2024-06-12 NOTE — TELEPHONE ENCOUNTER
[id number] [employer] [facility] [place] sent Rx request for the following:      Requested Prescriptions   Pending Prescriptions Disp Refills    ondansetron (ZOFRAN) 4 MG tablet [Pharmacy Med Name: ONDANSETRON HCL 4 MG TABLET] 20 tablet 1     Sig: TAKE 1 TAB BY MOUTH EVERY 8HRS AS NEEDED FOR NAUSEA AND VOMITING   Last Prescription Date:   7/30/23  Last Fill Qty/Refills:         20, R-1    Last Office Visit:                10/23/23  7/10/23 (motion sickness discussed)    Future Office visit:             Future Appointments 6/12/2024 - 12/9/2024        Date Visit Type Length Department Provider     7/15/2024  8:45 AM MYC 3D SCREENING MAMMOGRAM 15 min  MAMMOGRAPHY [facility]    Location Instructions:     [facility] is located [address] and [address]              7/17/2024  9:00 AM PHYSICAL 40 min  FAMILY PRACTICE Melany Rogers MD    Location Instructions:     [facility] is located [address] and [address]                   Prescription refilled per RN Medication Refill Policy.................... Celia Medel RN ....................  6/12/2024   1:56 PM

## 2024-07-12 SDOH — HEALTH STABILITY: PHYSICAL HEALTH: ON AVERAGE, HOW MANY DAYS PER WEEK DO YOU ENGAGE IN MODERATE TO STRENUOUS EXERCISE (LIKE A BRISK WALK)?: 5 DAYS

## 2024-07-12 SDOH — HEALTH STABILITY: PHYSICAL HEALTH: ON AVERAGE, HOW MANY MINUTES DO YOU ENGAGE IN EXERCISE AT THIS LEVEL?: 40 MIN

## 2024-07-12 ASSESSMENT — SOCIAL DETERMINANTS OF HEALTH (SDOH): HOW OFTEN DO YOU GET TOGETHER WITH FRIENDS OR RELATIVES?: ONCE A WEEK

## 2024-07-15 ENCOUNTER — HOSPITAL ENCOUNTER (OUTPATIENT)
Dept: MAMMOGRAPHY | Facility: OTHER | Age: 69
Discharge: HOME | End: 2024-07-15
Attending: FAMILY MEDICINE | Admitting: FAMILY MEDICINE
Payer: COMMERCIAL

## 2024-07-15 DIAGNOSIS — Z12.31 VISIT FOR SCREENING MAMMOGRAM: ICD-10-CM

## 2024-07-15 PROCEDURE — 77063 BREAST TOMOSYNTHESIS BI: CPT

## 2024-07-17 ENCOUNTER — OFFICE VISIT (OUTPATIENT)
Dept: FAMILY MEDICINE | Facility: OTHER | Age: 69
End: 2024-07-17
Attending: FAMILY MEDICINE
Payer: COMMERCIAL

## 2024-07-17 VITALS
TEMPERATURE: 98 F | HEART RATE: 52 BPM | DIASTOLIC BLOOD PRESSURE: 82 MMHG | WEIGHT: 200 LBS | RESPIRATION RATE: 18 BRPM | BODY MASS INDEX: 33.32 KG/M2 | OXYGEN SATURATION: 97 % | HEIGHT: 65 IN | SYSTOLIC BLOOD PRESSURE: 144 MMHG

## 2024-07-17 DIAGNOSIS — E78.2 MIXED HYPERLIPIDEMIA: ICD-10-CM

## 2024-07-17 DIAGNOSIS — I10 PRIMARY HYPERTENSION: ICD-10-CM

## 2024-07-17 DIAGNOSIS — T75.3XXD MOTION SICKNESS, SUBSEQUENT ENCOUNTER: ICD-10-CM

## 2024-07-17 DIAGNOSIS — R73.01 IFG (IMPAIRED FASTING GLUCOSE): Primary | ICD-10-CM

## 2024-07-17 PROBLEM — Z87.898 HISTORY OF DISEASE: Status: RESOLVED | Noted: 2018-06-26 | Resolved: 2024-07-17

## 2024-07-17 PROBLEM — Z85.828 HISTORY OF SQUAMOUS CELL CARCINOMA OF SKIN: Status: RESOLVED | Noted: 2021-09-07 | Resolved: 2024-07-17

## 2024-07-17 LAB
ALBUMIN SERPL BCG-MCNC: 4.6 G/DL (ref 3.5–5.2)
ALP SERPL-CCNC: 71 U/L (ref 40–150)
ALT SERPL W P-5'-P-CCNC: 12 U/L (ref 0–50)
ANION GAP SERPL CALCULATED.3IONS-SCNC: 10 MMOL/L (ref 7–15)
AST SERPL W P-5'-P-CCNC: 19 U/L (ref 0–45)
BILIRUB SERPL-MCNC: 0.6 MG/DL
BUN SERPL-MCNC: 15.3 MG/DL (ref 8–23)
CALCIUM SERPL-MCNC: 10.3 MG/DL (ref 8.8–10.4)
CHLORIDE SERPL-SCNC: 100 MMOL/L (ref 98–107)
CHOLEST SERPL-MCNC: 252 MG/DL
CREAT SERPL-MCNC: 0.69 MG/DL (ref 0.51–0.95)
EGFRCR SERPLBLD CKD-EPI 2021: >90 ML/MIN/1.73M2
FASTING STATUS PATIENT QL REPORTED: YES
FASTING STATUS PATIENT QL REPORTED: YES
GLUCOSE SERPL-MCNC: 105 MG/DL (ref 70–99)
HBA1C MFR BLD: 5.4 % (ref 4–6.2)
HCO3 SERPL-SCNC: 30 MMOL/L (ref 22–29)
HDLC SERPL-MCNC: 75 MG/DL
LDLC SERPL CALC-MCNC: 167 MG/DL
NONHDLC SERPL-MCNC: 177 MG/DL
POTASSIUM SERPL-SCNC: 3.5 MMOL/L (ref 3.4–5.3)
PROT SERPL-MCNC: 7.3 G/DL (ref 6.4–8.3)
SODIUM SERPL-SCNC: 140 MMOL/L (ref 135–145)
TRIGL SERPL-MCNC: 48 MG/DL

## 2024-07-17 PROCEDURE — 99214 OFFICE O/P EST MOD 30 MIN: CPT | Mod: 25 | Performed by: FAMILY MEDICINE

## 2024-07-17 PROCEDURE — 83036 HEMOGLOBIN GLYCOSYLATED A1C: CPT | Mod: ZL | Performed by: FAMILY MEDICINE

## 2024-07-17 PROCEDURE — G0463 HOSPITAL OUTPT CLINIC VISIT: HCPCS | Performed by: FAMILY MEDICINE

## 2024-07-17 PROCEDURE — 36415 COLL VENOUS BLD VENIPUNCTURE: CPT | Mod: ZL | Performed by: FAMILY MEDICINE

## 2024-07-17 PROCEDURE — 82040 ASSAY OF SERUM ALBUMIN: CPT | Mod: ZL | Performed by: FAMILY MEDICINE

## 2024-07-17 PROCEDURE — G0439 PPPS, SUBSEQ VISIT: HCPCS | Performed by: FAMILY MEDICINE

## 2024-07-17 PROCEDURE — 83718 ASSAY OF LIPOPROTEIN: CPT | Mod: ZL | Performed by: FAMILY MEDICINE

## 2024-07-17 RX ORDER — ONDANSETRON 4 MG/1
4 TABLET, FILM COATED ORAL EVERY 8 HOURS PRN
Qty: 20 TABLET | Refills: 4 | Status: SHIPPED | OUTPATIENT
Start: 2024-07-17

## 2024-07-17 ASSESSMENT — PAIN SCALES - GENERAL: PAINLEVEL: NO PAIN (0)

## 2024-07-17 NOTE — PROGRESS NOTES
Preventive Care Visit  [facility]  Melany Rogers MD, Family Medicine  Jul 17, 2024      Assessment & Plan       ICD-10-CM    1. IFG (impaired fasting glucose)  R73.01 Hemoglobin A1c      2. Motion sickness, subsequent encounter  T75.3XXD ondansetron (ZOFRAN) 4 MG tablet      3. Primary hypertension  I10 Comprehensive Metabolic Panel      4. Mixed hyperlipidemia  E78.2 Lipid Panel            Uses zofran for motion sickness related to boating/car rides.     Discussed blood pressure.  Patient plans to get a blood pressure monitor.  Discussed goal blood pressure readings and other medications that could help achieve this.  Follow-up for medication adjustment if home blood pressure readings remain elevated.    Discussed hyperlipidemia and recommendation to start statin.  Patient is not interested in starting a statin.  She would like to see what today's lipid profile is, she has been working on lifestyle changes.  Discussed the possibility of obtaining a CT calcium score for further risk stratification if she would reconsider statin medication.  She will let us know what she would like to do.    Chronic right flank pain that certainly sounds like nerve irritation.  Previous workup without evidence of other pathology contributing to symptoms.  Symptoms are not frequent enough to justify a daily medication.  Could maybe think of something like nerve ablation if symptoms persist or worsen.    Reviewed cervical cancer screening recommendations.  Patient had a Pap smear in 2019.  No history of abnormal Pap smears.  No risk factors.  No further Pap smears planned at this time.    She would like to continue with colonoscopy for colon cancer screening.  She will be due next year.    Reviewed normal bone density scan done in 2023.  Consider repeat around age 76, but likely will not have an issue with osteoporosis.    Patient plans to get DTaP at pharmacy.  Thinking about Shingrix.    Sees dermatology for  "skin checks.      BMI  Estimated body mass index is 33.54 kg/m  as calculated from the following:    Height as of this encounter: 1.645 m (5' 4.75\").    Weight as of this encounter: 90.7 kg (200 lb).       Counseling  Appropriate preventive services were addressed with this patient via screening, questionnaire, or discussion as appropriate for fall prevention, nutrition, physical activity, Tobacco-use cessation, weight loss and cognition.  Checklist reviewing preventive services available has been given to the patient.  Reviewed patient's diet, addressing concerns and/or questions.   The patient was instructed to see the dentist every 6 months.   She is at risk for psychosocial distress and has been provided with information to reduce risk.         No follow-ups on file.    Chase Drew is a 68 year old, presenting for the following:  Physical (Yearly physical, wants a more through physical vs wellness exam. )        7/17/2024     9:03 AM   Additional Questions   Roomed by Maryjane   Accompanied by self         Health Care Directive  Patient does not have a Health Care Directive or Living Will: Discussed advance care planning with patient; however, patient declined at this time.    HPI    Patient with several years of right flank pain.  She cannot pinpoint the exact location where she has onset of sharp shooting pain.  This typically last seconds, sometimes a couple of minutes.  Not associated with eating, not associated with any other GI symptoms.  Imaging has been unremarkable.  PT has been unhelpful.  No history of shingles in this area.          7/12/2024   General Health   How would you rate your overall physical health? Good   Feel stress (tense, anxious, or unable to sleep) Only a little      (!) STRESS CONCERN      7/12/2024   Nutrition   Diet: Regular (no restrictions)            7/12/2024   Exercise   Days per week of moderate/strenous exercise 5 days   Average minutes spent exercising at this level 40 " min            7/12/2024   Social Factors   Frequency of gathering with friends or relatives Once a week   Worry food won't last until get money to buy more No   Food not last or not have enough money for food? No   Do you have housing? (Housing is defined as stable permanent housing and does not include staying ouside in a car, in a tent, in an abandoned building, in an overnight shelter, or couch-surfing.) No   Are you worried about losing your housing? No   Lack of transportation? No   Unable to get utilities (heat,electricity)? No   Want help with housing or utility concern? No      (!) HOUSING CONCERN PRESENT      7/12/2024   Fall Risk   Fallen 2 or more times in the past year? No   Trouble with walking or balance? No             7/12/2024   Activities of Daily Living- Home Safety   Needs help with the following daily activites None of the above   Safety concerns in the home None of the above            7/12/2024   Dental   Dentist two times every year? (!) NO            7/12/2024   Hearing Screening   Hearing concerns? None of the above            7/12/2024   Driving Risk Screening   Patient/family members have concerns about driving No            7/12/2024   General Alertness/Fatigue Screening   Have you been more tired than usual lately? No            7/12/2024   Urinary Incontinence Screening   Bothered by leaking urine in past 6 months No            7/12/2024   TB Screening   Were you born outside of the [place]? No            Today's PHQ-2 Score:       7/17/2024     8:48 AM   PHQ-2 ( 1999 Pfizer)   Q1: Little interest or pleasure in doing things 0   Q2: Feeling down, depressed or hopeless 0   PHQ-2 Score 0   Q1: Little interest or pleasure in doing things Not at all   Q2: Feeling down, depressed or hopeless Not at all   PHQ-2 Score 0           7/12/2024   Substance Use   Alcohol more than 3/day or more than 7/wk No   Do you have a current opioid prescription? No   How severe/bad is pain from 1 to 10? 1/10   Do  you use any other substances recreationally? (!) ALCOHOL        Social History     Tobacco Use    Smoking status: Never    Smokeless tobacco: Never   Vaping Use    Vaping status: Never Used   Substance Use Topics    Alcohol use: Yes     Comment: once a week maybe    Drug use: Never           7/15/2024   LAST FHS-7 RESULTS   1st degree relative breast or ovarian cancer No   Any relative bilateral breast cancer No   Any male have breast cancer No   Any woman with breast cancer before 50yrs No   2 or more relatives with breast AND/OR ovarian cancer No   2 or more relatives with breast AND/OR bowel cancer No               History of abnormal Pap smear: No - age 65 or older with pap indicated due to inadequate prior screening (3 consecutive negative cytology results, 2 consecutive negative cotesting results, or 2 consecutive negative HrHPV test results within 10 years, with the most recent test occurring within the recommended screening interval for the test used)       ASCVD Risk   The 10-year ASCVD risk score (Jamey TONY, et al., 2019) is: 11.7%    Values used to calculate the score:      Age: 68 years      Sex: Female      Is Non- : No      Diabetic: No      Tobacco smoker: No      Systolic Blood Pressure: 144 mmHg      Is BP treated: Yes      HDL Cholesterol: 73 mg/dL      Total Cholesterol: 256 mg/dL          Reviewed and updated as needed this visit by Provider                    Current providers sharing in care for this patient include:  Patient Care Team:  Melany Rogers MD as PCP - General (Family Medicine)  Melany Rogers MD as Assigned PCP    The following health maintenance items are reviewed in Epic and correct as of today:  Health Maintenance   Topic Date Due    HEPATITIS C SCREENING  Never done    ZOSTER IMMUNIZATION (1 of 2) Never done    RSV VACCINE (Pregnancy & 60+) (1 - 1-dose 60+ series) Never done    DTAP/TDAP/TD IMMUNIZATION (2 - Td or Tdap) 06/07/2020    COVID-19  "Vaccine (3 - 2023-24 season) 09/01/2023    LIPID  07/10/2024    MEDICARE ANNUAL WELLNESS VISIT  07/10/2024    INFLUENZA VACCINE (1) 09/01/2024    COLORECTAL CANCER SCREENING  07/05/2025    FALL RISK ASSESSMENT  07/17/2025    MAMMO SCREENING  07/15/2026    GLUCOSE  10/23/2026    ADVANCE CARE PLANNING  07/17/2029    DEXA  09/08/2038    PHQ-2 (once per calendar year)  Completed    Pneumococcal Vaccine: 65+ Years  Completed    IPV IMMUNIZATION  Aged Out    HPV IMMUNIZATION  Aged Out    MENINGITIS IMMUNIZATION  Aged Out    RSV MONOCLONAL ANTIBODY  Aged Out          Objective    Exam  BP (!) 144/82 (BP Location: Right arm, Patient Position: Sitting, Cuff Size: Adult Regular)   Pulse 52   Temp 98  F (36.7  C) (Tympanic)   Resp 18   Ht 1.645 m (5' 4.75\")   Wt 90.7 kg (200 lb)   LMP  (LMP Unknown)   SpO2 97%   BMI 33.54 kg/m     Estimated body mass index is 33.54 kg/m  as calculated from the following:    Height as of this encounter: 1.645 m (5' 4.75\").    Weight as of this encounter: 90.7 kg (200 lb).    Physical Exam  Constitutional:       Appearance: She is well-developed.   Eyes:      Conjunctiva/sclera: Conjunctivae normal.   Neck:      Thyroid: No thyromegaly.   Cardiovascular:      Rate and Rhythm: Normal rate and regular rhythm.      Heart sounds: Normal heart sounds. No murmur heard.  Pulmonary:      Effort: Pulmonary effort is normal. No respiratory distress.      Breath sounds: Normal breath sounds.   Abdominal:      Palpations: Abdomen is soft.   Lymphadenopathy:      Cervical: No cervical adenopathy.   Skin:     Findings: No rash.   Neurological:      Mental Status: She is alert and oriented to person, place, and time.             7/17/2024   Mini Cog   Clock Draw Score 2 Normal   3 Item Recall 3 objects recalled   Mini Cog Total Score 5                 Signed Electronically by: Melany Rogers MD    "

## 2024-07-17 NOTE — NURSING NOTE
"Chief Complaint   Patient presents with    Physical     Yearly physical, wants a more through physical vs wellness exam.        Initial BP (!) 144/82 (BP Location: Right arm, Patient Position: Sitting, Cuff Size: Adult Regular)   Pulse 52   Temp 98  F (36.7  C) (Tympanic)   Resp 18   Ht 1.645 m (5' 4.75\")   Wt 90.7 kg (200 lb)   LMP  (LMP Unknown)   SpO2 97%   BMI 33.54 kg/m   Estimated body mass index is 33.54 kg/m  as calculated from the following:    Height as of this encounter: 1.645 m (5' 4.75\").    Weight as of this encounter: 90.7 kg (200 lb).  Medication Reconciliation: complete    Maryjane Granger LPN    Advance Care Directive reviewed    "

## 2025-07-17 SDOH — HEALTH STABILITY: PHYSICAL HEALTH: ON AVERAGE, HOW MANY MINUTES DO YOU ENGAGE IN EXERCISE AT THIS LEVEL?: 40 MIN

## 2025-07-17 SDOH — HEALTH STABILITY: PHYSICAL HEALTH: ON AVERAGE, HOW MANY DAYS PER WEEK DO YOU ENGAGE IN MODERATE TO STRENUOUS EXERCISE (LIKE A BRISK WALK)?: 4 DAYS

## 2025-07-17 ASSESSMENT — SOCIAL DETERMINANTS OF HEALTH (SDOH): HOW OFTEN DO YOU GET TOGETHER WITH FRIENDS OR RELATIVES?: ONCE A WEEK

## 2025-07-21 ENCOUNTER — RESULTS FOLLOW-UP (OUTPATIENT)
Dept: FAMILY MEDICINE | Facility: OTHER | Age: 70
End: 2025-07-21

## 2025-07-21 ENCOUNTER — OFFICE VISIT (OUTPATIENT)
Dept: FAMILY MEDICINE | Facility: OTHER | Age: 70
End: 2025-07-21
Attending: FAMILY MEDICINE
Payer: COMMERCIAL

## 2025-07-21 VITALS
HEIGHT: 65 IN | BODY MASS INDEX: 30.62 KG/M2 | SYSTOLIC BLOOD PRESSURE: 128 MMHG | RESPIRATION RATE: 16 BRPM | DIASTOLIC BLOOD PRESSURE: 64 MMHG | OXYGEN SATURATION: 99 % | TEMPERATURE: 97.6 F | WEIGHT: 183.8 LBS | HEART RATE: 62 BPM

## 2025-07-21 DIAGNOSIS — E66.01 MORBID OBESITY (H): ICD-10-CM

## 2025-07-21 DIAGNOSIS — T75.3XXD MOTION SICKNESS, SUBSEQUENT ENCOUNTER: ICD-10-CM

## 2025-07-21 DIAGNOSIS — R73.01 IFG (IMPAIRED FASTING GLUCOSE): ICD-10-CM

## 2025-07-21 DIAGNOSIS — Z11.59 NEED FOR HEPATITIS C SCREENING TEST: ICD-10-CM

## 2025-07-21 DIAGNOSIS — I10 BENIGN ESSENTIAL HYPERTENSION: ICD-10-CM

## 2025-07-21 DIAGNOSIS — E78.5 DYSLIPIDEMIA: Primary | ICD-10-CM

## 2025-07-21 DIAGNOSIS — Z12.11 SCREENING FOR MALIGNANT NEOPLASM OF COLON: ICD-10-CM

## 2025-07-21 LAB
ANION GAP SERPL CALCULATED.3IONS-SCNC: 12 MMOL/L (ref 7–15)
BUN SERPL-MCNC: 20 MG/DL (ref 8–23)
CALCIUM SERPL-MCNC: 10 MG/DL (ref 8.8–10.4)
CHLORIDE SERPL-SCNC: 102 MMOL/L (ref 98–107)
CHOLEST SERPL-MCNC: 265 MG/DL
CREAT SERPL-MCNC: 0.64 MG/DL (ref 0.51–0.95)
EGFRCR SERPLBLD CKD-EPI 2021: >90 ML/MIN/1.73M2
EST. AVERAGE GLUCOSE BLD GHB EST-MCNC: 111 MG/DL
FASTING STATUS PATIENT QL REPORTED: YES
FASTING STATUS PATIENT QL REPORTED: YES
GLUCOSE SERPL-MCNC: 89 MG/DL (ref 70–99)
HBA1C MFR BLD: 5.5 %
HCO3 SERPL-SCNC: 27 MMOL/L (ref 22–29)
HCV AB SERPL QL IA: NONREACTIVE
HDLC SERPL-MCNC: 71 MG/DL
LDLC SERPL CALC-MCNC: 182 MG/DL
NONHDLC SERPL-MCNC: 194 MG/DL
POTASSIUM SERPL-SCNC: 3.7 MMOL/L (ref 3.4–5.3)
SODIUM SERPL-SCNC: 141 MMOL/L (ref 135–145)
TRIGL SERPL-MCNC: 58 MG/DL

## 2025-07-21 PROCEDURE — 36415 COLL VENOUS BLD VENIPUNCTURE: CPT | Mod: ZL | Performed by: FAMILY MEDICINE

## 2025-07-21 PROCEDURE — G0463 HOSPITAL OUTPT CLINIC VISIT: HCPCS | Performed by: FAMILY MEDICINE

## 2025-07-21 PROCEDURE — 80048 BASIC METABOLIC PNL TOTAL CA: CPT | Mod: ZL | Performed by: FAMILY MEDICINE

## 2025-07-21 PROCEDURE — 86803 HEPATITIS C AB TEST: CPT | Mod: ZL | Performed by: FAMILY MEDICINE

## 2025-07-21 PROCEDURE — 83036 HEMOGLOBIN GLYCOSYLATED A1C: CPT | Mod: ZL | Performed by: FAMILY MEDICINE

## 2025-07-21 PROCEDURE — 82465 ASSAY BLD/SERUM CHOLESTEROL: CPT | Mod: ZL | Performed by: FAMILY MEDICINE

## 2025-07-21 RX ORDER — METOPROLOL TARTRATE 50 MG
50 TABLET ORAL 2 TIMES DAILY
Qty: 180 TABLET | Refills: 4 | Status: SHIPPED | OUTPATIENT
Start: 2025-07-21

## 2025-07-21 RX ORDER — HYDROCHLOROTHIAZIDE 25 MG/1
25 TABLET ORAL DAILY
Qty: 90 TABLET | Refills: 4 | Status: SHIPPED | OUTPATIENT
Start: 2025-07-21

## 2025-07-21 RX ORDER — ONDANSETRON 4 MG/1
4 TABLET, FILM COATED ORAL EVERY 8 HOURS PRN
Qty: 20 TABLET | Refills: 4 | Status: SHIPPED | OUTPATIENT
Start: 2025-07-21

## 2025-07-21 ASSESSMENT — PAIN SCALES - GENERAL: PAINLEVEL_OUTOF10: NO PAIN (0)

## 2025-07-21 NOTE — PROGRESS NOTES
"Preventive Care Visit  [facility]  Melany Rogers MD, Family Medicine  Jul 21, 2025      Assessment & Plan       ICD-10-CM    1. Dyslipidemia  E78.5 Lipid Panel     CT Coronary Calcium Scan     Lipid Panel      2. Benign essential hypertension  I10 hydrochlorothiazide (HYDRODIURIL) 25 MG tablet     metoprolol tartrate (LOPRESSOR) 50 MG tablet     Basic Metabolic Panel     Basic Metabolic Panel      3. Motion sickness, subsequent encounter  T75.3XXD ondansetron (ZOFRAN) 4 MG tablet      4. Need for hepatitis C screening test  Z11.59 Hepatitis C Screen Reflex to HCV RNA Quant and Genotype     Hepatitis C Screen Reflex to HCV RNA Quant and Genotype      5. IFG (impaired fasting glucose)  R73.01 Hemoglobin A1c     Hemoglobin A1c      6. Screening for malignant neoplasm of colon  Z12.11 Colonoscopy Screening  Referral      7. Morbid obesity (H)  E66.01           Reviewed cervical cancer screening recommendations. Patient had a Pap smear in 2019. No history of abnormal Pap smears. No risk factors. No further Pap smears planned at this time.    She would like to continue with colonoscopy for colon cancer screening. Orders placed.     Previous DEXA with normal bone density.  Consider repeat DEXA around age 76.  Mammogram scheduled.    Labs today.  Medications reviewed and refilled.    Dermatology appointment scheduled    Plan CT calcium score based on previous lipid profile and ASCVD risk of over 10%.    BMI  Estimated body mass index is 30.82 kg/m  as calculated from the following:    Height as of this encounter: 1.645 m (5' 4.75\").    Weight as of this encounter: 83.4 kg (183 lb 12.8 oz).       Counseling  Appropriate preventive services were addressed with this patient via screening, questionnaire, or discussion as appropriate for fall prevention, nutrition, physical activity, Tobacco-use cessation, social engagement, weight loss and cognition.  Checklist reviewing preventive services " available has been given to the patient.  Reviewed patient's diet, addressing concerns and/or questions.   The patient was provided with written information regarding signs of hearing loss.         Chase Drew is a 69 year old, presenting for the following:  Physical (Here for annual wellness physical. )        7/21/2025     9:11 AM   Additional Questions   Roomed by Geri   Accompanied by self          HPI          Advance Care Planning    Discussed advance care planning with patient; informed AVS has link to [facility] [employer].        7/17/2025   General Health   How would you rate your overall physical health? Good   Feel stress (tense, anxious, or unable to sleep) Not at all         7/17/2025   Nutrition   Diet: Regular (no restrictions)         7/17/2025   Exercise   Days per week of moderate/strenous exercise 4 days   Average minutes spent exercising at this level 40 min         7/17/2025   Social Factors   Frequency of gathering with friends or relatives Once a week   Worry food won't last until get money to buy more No   Food not last or not have enough money for food? No   Do you have housing? (Housing is defined as stable permanent housing and does not include staying outside in a car, in a tent, in an abandoned building, in an overnight shelter, or couch-surfing.) Yes   Are you worried about losing your housing? No   Lack of transportation? No   Unable to get utilities (heat,electricity)? No         7/17/2025   Fall Risk   Fallen 2 or more times in the past year? No   Trouble with walking or balance? No          7/17/2025   Activities of Daily Living- Home Safety   Needs help with the following daily activites None of the above   Safety concerns in the home None of the above         7/17/2025   Dental   Dentist two times every year? Yes         7/17/2025   Hearing Screening   Hearing concerns? (!) I NEED TO ASK PEOPLE TO SPEAK UP OR REPEAT THEMSELVES.    (!) IT'S HARD TO FOLLOW A CONVERSATION IN A  NOISY RESTAURANT OR CROWDED ROOM.   Would you like a referral for hearing testing? No       Multiple values from one day are sorted in reverse-chronological order         7/17/2025   Driving Risk Screening   Patient/family members have concerns about driving No         7/17/2025   General Alertness/Fatigue Screening   Have you been more tired than usual lately? No         7/17/2025   Urinary Incontinence Screening   Bothered by leaking urine in past 6 months No         Today's PHQ-2 Score:       7/21/2025     8:56 AM   PHQ-2 ( 1999 Pfizer)   Q1: Little interest or pleasure in doing things 0   Q2: Feeling down, depressed or hopeless 0   PHQ-2 Score 0    Q1: Little interest or pleasure in doing things Not at all   Q2: Feeling down, depressed or hopeless Not at all   PHQ-2 Score 0       Patient-reported           7/17/2025   Substance Use   Alcohol more than 3/day or more than 7/wk No   Do you have a current opioid prescription? No   How severe/bad is pain from 1 to 10? 0/10 (No Pain)   Do you use any other substances recreationally? No     Social History     Tobacco Use    Smoking status: Never    Smokeless tobacco: Never   Vaping Use    Vaping status: Never Used   Substance Use Topics    Alcohol use: Yes     Comment: once a week maybe    Drug use: Never           7/15/2024   LAST FHS-7 RESULTS   1st degree relative breast or ovarian cancer No   Any relative bilateral breast cancer No   Any male have breast cancer No   Any ONE woman have BOTH breast AND ovarian cancer --   Any woman with breast cancer before 50yrs No   2 or more relatives with breast AND/OR ovarian cancer No   2 or more relatives with breast AND/OR bowel cancer No            History of abnormal Pap smear: No - age 65 or older with adequate negative prior screening test results (3 consecutive negative cytology results, 2 consecutive negative cotesting results, or 2 consecutive negative HrHPV test results within 10 years, with the most recent test  "occurring within the recommended screening interval for the test used)       ASCVD Risk   The 10-year ASCVD risk score (Jamey TONY, et al., 2019) is: 10.2%    Values used to calculate the score:      Age: 69 years      Sex: Female      Is Non- : No      Diabetic: No      Tobacco smoker: No      Systolic Blood Pressure: 128 mmHg      Is BP treated: Yes      HDL Cholesterol: 75 mg/dL      Total Cholesterol: 252 mg/dL            Reviewed and updated as needed this visit by Provider                    Current providers sharing in care for this patient include:  Patient Care Team:  Melany Rogers MD as PCP - General (Family Medicine)  Melany Rogers MD as Assigned PCP    The following health maintenance items are reviewed in Epic and correct as of today:  Health Maintenance   Topic Date Due    HEPATITIS C SCREENING  Never done    ZOSTER VACCINE (1 of 2) Never done    DTAP/TDAP/TD VACCINE (2 - Td or Tdap) 06/07/2020    COVID-19 VACCINE (3 - 2024-25 season) 09/01/2024    BMP  07/17/2025    LIPID  07/17/2025    COLORECTAL CANCER SCREENING  07/05/2025    INFLUENZA VACCINE (1) 09/01/2025    MAMMO SCREENING  07/15/2026    MEDICARE ANNUAL WELLNESS VISIT  07/21/2026    FALL RISK ASSESSMENT  07/21/2026    DIABETES SCREENING  07/17/2027    ADVANCE CARE PLANNING  07/21/2030    RSV VACCINE (1 - 1-dose 75+ series) 07/26/2031    DEXA  09/08/2038    PHQ-2 (once per calendar year)  Completed    PNEUMOCOCCAL VACCINE 50+ YEARS  Completed    HPV VACCINE  Aged Out    MENINGITIS VACCINE  Aged Out          Objective    Exam  /64 (BP Location: Right arm, Patient Position: Sitting, Cuff Size: Adult Regular)   Pulse 62   Temp 97.6  F (36.4  C) (Tympanic)   Resp 16   Ht 1.645 m (5' 4.75\")   Wt 83.4 kg (183 lb 12.8 oz)   LMP  (LMP Unknown)   SpO2 99%   BMI 30.82 kg/m     Estimated body mass index is 30.82 kg/m  as calculated from the following:    Height as of this encounter: 1.645 m (5' 4.75\").    " Weight as of this encounter: 83.4 kg (183 lb 12.8 oz).    Physical Exam  Constitutional:       Appearance: She is well-developed.   Eyes:      Conjunctiva/sclera: Conjunctivae normal.   Neck:      Thyroid: No thyromegaly.   Cardiovascular:      Rate and Rhythm: Normal rate and regular rhythm.      Heart sounds: Normal heart sounds. No murmur heard.  Pulmonary:      Effort: Pulmonary effort is normal. No respiratory distress.      Breath sounds: Normal breath sounds.   Abdominal:      Palpations: Abdomen is soft.   Lymphadenopathy:      Cervical: No cervical adenopathy.   Skin:     Findings: No rash.   Neurological:      Mental Status: She is alert and oriented to person, place, and time.             7/21/2025   Mini Cog   Clock Draw Score 2 Normal   3 Item Recall 2 objects recalled   Mini Cog Total Score 4              Signed Electronically by: Melany Rogers MD

## 2025-07-21 NOTE — NURSING NOTE
"Chief Complaint   Patient presents with    Physical     Here for annual wellness physical.        Initial /64 (BP Location: Right arm, Patient Position: Sitting, Cuff Size: Adult Regular)   Pulse 62   Temp 97.6  F (36.4  C) (Tympanic)   Resp 16   Ht 1.645 m (5' 4.75\")   Wt 83.4 kg (183 lb 12.8 oz)   LMP  (LMP Unknown)   SpO2 99%   BMI 30.82 kg/m   Estimated body mass index is 30.82 kg/m  as calculated from the following:    Height as of this encounter: 1.645 m (5' 4.75\").    Weight as of this encounter: 83.4 kg (183 lb 12.8 oz).  Medication Reconciliation: complete    Maryjane Granger LPN    Advance Care Directive reviewed    "

## 2025-07-29 DIAGNOSIS — Z12.11 SCREENING FOR COLON CANCER: Primary | ICD-10-CM

## 2025-07-29 NOTE — TELEPHONE ENCOUNTER
Screening Questions for the Scheduling of Screening Colonoscopies   (If Colonoscopy is diagnostic, Provider should review the chart before scheduling.)  Are you younger than 45 or older than 80?  NO  Do you take aspirin or fish oil?  ASPIRIN AND FISH OIL (if yes, tell patient to stop 1 week prior to Colonoscopy)  Do you take warfarin (Coumadin), clopidogrel (Plavix), apixaban (Eliquis), dabigatram (Pradaxa), rivaroxaban (Xarelto) or any blood thinner? NO  Do you take semaglutide (Ozempic or Wegovy), tirzepatide (Mounjaro or Zepbound), liraglutide (Victoza), or dulaglutide (Trulicity)? NO  Do you use oxygen or a CPAP at home?  NO  Do you have kidney disease? NO  Are you on dialysis? NO  Have you had a stroke or heart attack in the last year? NO  Have you had a stent in your heart or any blood vessel in the last year? NO  Have you had a transplant of any organ? NO  Have you had a colonoscopy or upper endoscopy (EGD) before? YES         When?  2015  Date of scheduled Colonoscopy. 09/26/2025  Provider [facility]  Pharmacy [facility]  Order Priority ROUTINE

## 2025-07-30 RX ORDER — BISACODYL 5 MG/1
TABLET, DELAYED RELEASE ORAL
Qty: 2 TABLET | Refills: 0 | Status: SHIPPED | OUTPATIENT
Start: 2025-09-19 | End: 2025-10-03

## 2025-07-30 RX ORDER — POLYETHYLENE GLYCOL 3350, SODIUM CHLORIDE, SODIUM BICARBONATE, POTASSIUM CHLORIDE 420; 11.2; 5.72; 1.48 G/4L; G/4L; G/4L; G/4L
4000 POWDER, FOR SOLUTION ORAL ONCE
Qty: 4000 ML | Refills: 0 | Status: SHIPPED | OUTPATIENT
Start: 2025-09-19 | End: 2025-09-19

## 2025-08-05 ENCOUNTER — HOSPITAL ENCOUNTER (OUTPATIENT)
Dept: MAMMOGRAPHY | Facility: OTHER | Age: 70
Discharge: HOME | End: 2025-08-05
Attending: FAMILY MEDICINE
Payer: COMMERCIAL

## 2025-08-05 DIAGNOSIS — Z12.31 VISIT FOR SCREENING MAMMOGRAM: ICD-10-CM

## 2025-08-05 PROCEDURE — 77063 BREAST TOMOSYNTHESIS BI: CPT

## 2025-08-11 ENCOUNTER — HOSPITAL ENCOUNTER (OUTPATIENT)
Dept: CT IMAGING | Facility: OTHER | Age: 70
Discharge: HOME | End: 2025-08-11
Attending: FAMILY MEDICINE | Admitting: FAMILY MEDICINE
Payer: COMMERCIAL

## 2025-08-11 DIAGNOSIS — E78.5 DYSLIPIDEMIA: ICD-10-CM

## 2025-08-11 PROCEDURE — 75571 CT HRT W/O DYE W/CA TEST: CPT

## (undated) DEVICE — SUCTION MANIFOLD NEPTUNE 2 SYS 4 PORT 0702-020-000

## (undated) DEVICE — ENDO FORCEP ENDOJAW BIOPSY 2.8MMX230CM FB-220U

## (undated) DEVICE — SOL WATER 1500ML

## (undated) DEVICE — ENDO BITE BLOCK 60 MAXI LF 00712804

## (undated) DEVICE — SYR 50ML LL W/O NDL 309653

## (undated) DEVICE — Device

## (undated) DEVICE — ENDO KIT COMPLIANCE DYKENDOCMPLY

## (undated) DEVICE — TUBING SUCTION 10'X3/16" N510

## (undated) RX ORDER — PROPOFOL 10 MG/ML
INJECTION, EMULSION INTRAVENOUS
Status: DISPENSED
Start: 2024-01-30